# Patient Record
Sex: MALE | Race: WHITE | NOT HISPANIC OR LATINO | Employment: UNEMPLOYED | URBAN - METROPOLITAN AREA
[De-identification: names, ages, dates, MRNs, and addresses within clinical notes are randomized per-mention and may not be internally consistent; named-entity substitution may affect disease eponyms.]

---

## 2019-07-18 ENCOUNTER — LAB VISIT (OUTPATIENT)
Dept: LAB | Facility: HOSPITAL | Age: 44
End: 2019-07-18
Attending: INTERNAL MEDICINE
Payer: MEDICAID

## 2019-07-18 DIAGNOSIS — E53.8 VITAMIN B 12 DEFICIENCY: ICD-10-CM

## 2019-07-18 DIAGNOSIS — B18.2 CHRONIC HEPATITIS C: Primary | ICD-10-CM

## 2019-07-18 DIAGNOSIS — D64.9 ANEMIA: ICD-10-CM

## 2019-07-18 DIAGNOSIS — R73.9 HYPERGLYCEMIA: ICD-10-CM

## 2019-07-18 LAB
ALBUMIN SERPL BCP-MCNC: 4.5 G/DL (ref 3.5–5.2)
ALP SERPL-CCNC: 96 U/L (ref 55–135)
ALT SERPL W/O P-5'-P-CCNC: 171 U/L (ref 10–44)
ANION GAP SERPL CALC-SCNC: 11 MMOL/L (ref 8–16)
AST SERPL-CCNC: 91 U/L (ref 10–40)
BASOPHILS # BLD AUTO: 0.01 K/UL (ref 0–0.2)
BASOPHILS NFR BLD: 0.2 % (ref 0–1.9)
BILIRUB SERPL-MCNC: 0.6 MG/DL (ref 0.1–1)
BUN SERPL-MCNC: 23 MG/DL (ref 6–20)
CALCIUM SERPL-MCNC: 10.2 MG/DL (ref 8.7–10.5)
CHLORIDE SERPL-SCNC: 101 MMOL/L (ref 95–110)
CHOLEST SERPL-MCNC: 269 MG/DL (ref 120–199)
CHOLEST/HDLC SERPL: 4.8 {RATIO} (ref 2–5)
CO2 SERPL-SCNC: 27 MMOL/L (ref 23–29)
CREAT SERPL-MCNC: 1.1 MG/DL (ref 0.5–1.4)
DIFFERENTIAL METHOD: NORMAL
EOSINOPHIL # BLD AUTO: 0.2 K/UL (ref 0–0.5)
EOSINOPHIL NFR BLD: 3.9 % (ref 0–8)
ERYTHROCYTE [DISTWIDTH] IN BLOOD BY AUTOMATED COUNT: 14.4 % (ref 11.5–14.5)
EST. GFR  (AFRICAN AMERICAN): >60 ML/MIN/1.73 M^2
EST. GFR  (NON AFRICAN AMERICAN): >60 ML/MIN/1.73 M^2
ESTIMATED AVG GLUCOSE: 108 MG/DL (ref 68–131)
FERRITIN SERPL-MCNC: 59 NG/ML (ref 20–300)
GLUCOSE SERPL-MCNC: 102 MG/DL (ref 70–110)
HBA1C MFR BLD HPLC: 5.4 % (ref 4–5.6)
HCT VFR BLD AUTO: 46 % (ref 40–54)
HDLC SERPL-MCNC: 56 MG/DL (ref 40–75)
HDLC SERPL: 20.8 % (ref 20–50)
HGB BLD-MCNC: 15.2 G/DL (ref 14–18)
LDLC SERPL CALC-MCNC: 177.8 MG/DL (ref 63–159)
LYMPHOCYTES # BLD AUTO: 2.5 K/UL (ref 1–4.8)
LYMPHOCYTES NFR BLD: 47.9 % (ref 18–48)
MCH RBC QN AUTO: 31 PG (ref 27–31)
MCHC RBC AUTO-ENTMCNC: 33 G/DL (ref 32–36)
MCV RBC AUTO: 94 FL (ref 82–98)
MONOCYTES # BLD AUTO: 0.3 K/UL (ref 0.3–1)
MONOCYTES NFR BLD: 6 % (ref 4–15)
NEUTROPHILS # BLD AUTO: 2.2 K/UL (ref 1.8–7.7)
NEUTROPHILS NFR BLD: 42 % (ref 38–73)
NONHDLC SERPL-MCNC: 213 MG/DL
PLATELET # BLD AUTO: 308 K/UL (ref 150–350)
PMV BLD AUTO: 9.7 FL (ref 9.2–12.9)
POTASSIUM SERPL-SCNC: 4.2 MMOL/L (ref 3.5–5.1)
PROT SERPL-MCNC: 8.1 G/DL (ref 6–8.4)
RBC # BLD AUTO: 4.9 M/UL (ref 4.6–6.2)
SODIUM SERPL-SCNC: 139 MMOL/L (ref 136–145)
TRIGL SERPL-MCNC: 176 MG/DL (ref 30–150)
TSH SERPL DL<=0.005 MIU/L-ACNC: 1.76 UIU/ML (ref 0.4–4)
TSH SERPL DL<=0.005 MIU/L-ACNC: 1.76 UIU/ML (ref 0.4–4)
VIT B12 SERPL-MCNC: 632 PG/ML (ref 210–950)
WBC # BLD AUTO: 5.18 K/UL (ref 3.9–12.7)

## 2019-07-18 PROCEDURE — 82728 ASSAY OF FERRITIN: CPT

## 2019-07-18 PROCEDURE — 80061 LIPID PANEL: CPT

## 2019-07-18 PROCEDURE — 84443 ASSAY THYROID STIM HORMONE: CPT

## 2019-07-18 PROCEDURE — 83036 HEMOGLOBIN GLYCOSYLATED A1C: CPT

## 2019-07-18 PROCEDURE — 85025 COMPLETE CBC W/AUTO DIFF WBC: CPT

## 2019-07-18 PROCEDURE — 82607 VITAMIN B-12: CPT

## 2019-07-18 PROCEDURE — 87522 HEPATITIS C REVRS TRNSCRPJ: CPT

## 2019-07-18 PROCEDURE — 36415 COLL VENOUS BLD VENIPUNCTURE: CPT

## 2019-07-18 PROCEDURE — 80053 COMPREHEN METABOLIC PANEL: CPT

## 2019-07-23 LAB
HCV RNA SERPL NAA+PROBE-LOG IU: 5.48 LOG (10) IU/ML
HCV RNA SERPL QL NAA+PROBE: DETECTED IU/ML
HCV RNA SPEC NAA+PROBE-ACNC: ABNORMAL IU/ML

## 2019-08-02 ENCOUNTER — TELEPHONE (OUTPATIENT)
Dept: GASTROENTEROLOGY | Facility: CLINIC | Age: 44
End: 2019-08-02

## 2019-08-02 NOTE — TELEPHONE ENCOUNTER
Spoke with patient informing him that Dr. Womack has an opening on 8/12/2019 in [place]. Patient stated he can make that appointment. Patient scheduled his office visit with Dr. Womack in [place].

## 2019-08-02 NOTE — TELEPHONE ENCOUNTER
----- Message from Naty Feng sent at 8/2/2019 10:03 AM CDT -----  Contact: [phone]/self  Type:  Sooner Appointment Request     Caller is requesting a sooner appointment.  Caller declined first available appointment listed below.  Caller will not accept being placed on the waitlist and is requesting a message be sent to doctor.  Name of Caller: pt  When is the first available appointment? pt  Symptoms or Reason: rectal bleeding  Would the patient rather a call back or a response via [facility]? Call back  Best Call Back Number: [phone]  Additional Information:

## 2019-08-12 ENCOUNTER — OFFICE VISIT (OUTPATIENT)
Dept: GASTROENTEROLOGY | Facility: CLINIC | Age: 44
End: 2019-08-12
Payer: MEDICAID

## 2019-08-12 VITALS
RESPIRATION RATE: 18 BRPM | HEIGHT: 71 IN | WEIGHT: 237.63 LBS | SYSTOLIC BLOOD PRESSURE: 120 MMHG | DIASTOLIC BLOOD PRESSURE: 90 MMHG | HEART RATE: 80 BPM | BODY MASS INDEX: 33.27 KG/M2

## 2019-08-12 DIAGNOSIS — K52.9 CHRONIC DIARRHEA: Primary | ICD-10-CM

## 2019-08-12 DIAGNOSIS — K62.5 RECTAL BLEEDING: ICD-10-CM

## 2019-08-12 DIAGNOSIS — B18.2 CHRONIC HEPATITIS C WITHOUT HEPATIC COMA: ICD-10-CM

## 2019-08-12 PROCEDURE — 99204 OFFICE O/P NEW MOD 45 MIN: CPT | Mod: S$PBB,,, | Performed by: INTERNAL MEDICINE

## 2019-08-12 PROCEDURE — 99204 PR OFFICE/OUTPT VISIT, NEW, LEVL IV, 45-59 MIN: ICD-10-PCS | Mod: S$PBB,,, | Performed by: INTERNAL MEDICINE

## 2019-08-12 PROCEDURE — 99214 OFFICE O/P EST MOD 30 MIN: CPT | Mod: PBBFAC | Performed by: INTERNAL MEDICINE

## 2019-08-12 PROCEDURE — 99999 PR PBB SHADOW E&M-EST. PATIENT-LVL IV: CPT | Mod: PBBFAC,,, | Performed by: INTERNAL MEDICINE

## 2019-08-12 PROCEDURE — 99999 PR PBB SHADOW E&M-EST. PATIENT-LVL IV: ICD-10-PCS | Mod: PBBFAC,,, | Performed by: INTERNAL MEDICINE

## 2019-08-12 RX ORDER — METOPROLOL SUCCINATE 50 MG/1
50 TABLET, EXTENDED RELEASE ORAL DAILY
Refills: 11 | Status: ON HOLD | COMMUNITY
Start: 2019-08-05 | End: 2020-02-05 | Stop reason: HOSPADM

## 2019-08-12 RX ORDER — POLYETHYLENE GLYCOL 3350, SODIUM SULFATE ANHYDROUS, SODIUM BICARBONATE, SODIUM CHLORIDE, POTASSIUM CHLORIDE 236; 22.74; 6.74; 5.86; 2.97 G/4L; G/4L; G/4L; G/4L; G/4L
POWDER, FOR SOLUTION ORAL
Qty: 1 BOTTLE | Refills: 0 | Status: ON HOLD | OUTPATIENT
Start: 2019-08-12 | End: 2019-08-20 | Stop reason: ALTCHOICE

## 2019-08-12 RX ORDER — ERGOCALCIFEROL 1.25 MG/1
CAPSULE ORAL
Refills: 5 | Status: ON HOLD | COMMUNITY
Start: 2019-05-09 | End: 2020-02-05 | Stop reason: HOSPADM

## 2019-08-12 RX ORDER — OMEPRAZOLE 20 MG/1
20 CAPSULE, DELAYED RELEASE ORAL DAILY
Refills: 11 | Status: ON HOLD | COMMUNITY
Start: 2019-08-05 | End: 2020-02-05 | Stop reason: HOSPADM

## 2019-08-12 RX ORDER — PRAVASTATIN SODIUM 20 MG/1
20 TABLET ORAL
COMMUNITY
End: 2019-10-28

## 2019-08-12 NOTE — PROGRESS NOTES
"Subjective:       Patient ID: You Seals is a 43 y.o. male.    Chief Complaint: Diarrhea and Hepatitis C    42 yo M here for evaluation of chronic diarrhea, rectal bleeding, and Hep C.  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  In 4/2018 he had an URI with "high fever" for which he was given abx, and has had severe watery diarrhea since that time.  He has 6-7 BM per day, all of which are very urgent and explosive.  He has nocturnal symptoms at times, and states that he always has diarrhea upon waking and always after eating.  Intermittently he will see red blood in the toilet and on tissue paper.  He denies weight loss.  He went on a healthy low carb diet and lost 18# which he states has helped with the "pressure" he feels in his belly, but has made no change in the bowel habits.  He denies FH of CRC or IBD.  He saw a GI physician in [place] (he is from [place] but lives in [place] now) and had EGD with him, and was dx with HH.  He has also been dx with ventral hernia.    Review of Systems   Constitutional: Negative for appetite change and unexpected weight change.   Eyes: Negative for photophobia and visual disturbance.   Respiratory: Negative for chest tightness, shortness of breath and wheezing.    Cardiovascular: Negative for chest pain, palpitations and leg swelling.   Genitourinary: Negative for dysuria, flank pain and hematuria.   Musculoskeletal: Negative for joint swelling and myalgias.   Skin: Negative for color change and rash.   Neurological: Negative for dizziness and speech difficulty.   Psychiatric/Behavioral: Negative for confusion and hallucinations.       Objective:       BP (!) 120/90 (BP Location: Right arm, Patient Position: Sitting, BP Method: Medium (Manual))   Pulse 80   Resp 18   Ht 5' 11" (1.803 m)   Wt 107.8 kg (237 lb 10.5 oz)   BMI 33.15 kg/m²     Physical Exam   Constitutional: He is oriented to person, " place, and time. He appears well-developed and well-nourished.   HENT:   Head: Normocephalic and atraumatic.   Eyes: Pupils are equal, round, and reactive to light. EOM are normal.   Neck: Normal range of motion. Neck supple.   Cardiovascular: Normal rate, regular rhythm, normal heart sounds and intact distal pulses.   Pulmonary/Chest: Effort normal and breath sounds normal.   Abdominal: Soft. Bowel sounds are normal. He exhibits no distension and no mass. There is no tenderness. There is no rebound and no guarding.   + diastasis recti, but no herniation noted   Musculoskeletal: He exhibits no edema or deformity.   Neurological: He is alert and oriented to person, place, and time.   Skin: Skin is warm and dry.   Psychiatric: He has a normal mood and affect. His behavior is normal.       Lab Results   Component Value Date    WBC 5.18 07/18/2019    HGB 15.2 07/18/2019    HCT 46.0 07/18/2019    MCV 94 07/18/2019     07/18/2019     CMP  Sodium   Date Value Ref Range Status   07/18/2019 139 136 - 145 mmol/L Final     Potassium   Date Value Ref Range Status   07/18/2019 4.2 3.5 - 5.1 mmol/L Final     Chloride   Date Value Ref Range Status   07/18/2019 101 95 - 110 mmol/L Final     CO2   Date Value Ref Range Status   07/18/2019 27 23 - 29 mmol/L Final     Glucose   Date Value Ref Range Status   07/18/2019 102 70 - 110 mg/dL Final     BUN, Bld   Date Value Ref Range Status   07/18/2019 23 (H) 6 - 20 mg/dL Final     Creatinine   Date Value Ref Range Status   07/18/2019 1.1 0.5 - 1.4 mg/dL Final     Calcium   Date Value Ref Range Status   07/18/2019 10.2 8.7 - 10.5 mg/dL Final     Total Protein   Date Value Ref Range Status   07/18/2019 8.1 6.0 - 8.4 g/dL Final     Albumin   Date Value Ref Range Status   07/18/2019 4.5 3.5 - 5.2 g/dL Final     Total Bilirubin   Date Value Ref Range Status   07/18/2019 0.6 0.1 - 1.0 mg/dL Final     Comment:     For infants and newborns, interpretation of results should be based  on  "gestational age, weight and in agreement with clinical  observations.  Premature Infant recommended reference ranges:  Up to 24 hours.............<8.0 mg/dL  Up to 48 hours............<12.0 mg/dL  3-5 days..................<15.0 mg/dL  6-29 days.................<15.0 mg/dL       Alkaline Phosphatase   Date Value Ref Range Status   07/18/2019 96 55 - 135 U/L Final     AST   Date Value Ref Range Status   07/18/2019 91 (H) 10 - 40 U/L Final     ALT   Date Value Ref Range Status   07/18/2019 171 (H) 10 - 44 U/L Final     Anion Gap   Date Value Ref Range Status   07/18/2019 11 8 - 16 mmol/L Final     eGFR if    Date Value Ref Range Status   07/18/2019 >60 >60 mL/min/1.73 m^2 Final     eGFR if non    Date Value Ref Range Status   07/18/2019 >60 >60 mL/min/1.73 m^2 Final     Comment:     Calculation used to obtain the estimated glomerular filtration  rate (eGFR) is the CKD-EPI equation.        Hep C RNA ~ 300K, no genotype done    Assessment:       1. Chronic diarrhea    2. Rectal bleeding    3. Chronic hepatitis C without hepatic coma        Plan:       Chronic diarrhea; Rectal bleeding  -     Clostridium difficile EIA; Future; Expected date: 08/12/2019  -     Giardia / Cryptosporidum, EIA; Future; Expected date: 08/12/2019  -     WBC, Stool; Future; Expected date: 08/12/2019  -     Pancreatic elastase, fecal; Future; Expected date: 08/12/2019  -     Stool culture; Future; Expected date: 08/12/2019  -     Case request GI: COLONOSCOPY  -     polyethylene glycol (GOLYTELY,NULYTELY) 236-22.74-6.74 -5.86 gram suspension; Use as directed  Dispense: 1 Bottle; Refill: 0  &&&&&&  IF C. DIFF IS POSITIVE, THE COLONOSCOPY WILL BE DELAYED UNTIL TREATMENT COMPLETE.    Chronic hepatitis C without hepatic coma  -     Ambulatory Referral to Hepatitis C  -     I will draw "hep c labs" at the colonoscopy appt so that the things needed will be complete for that appt.  I will defer u/s as they may want " Fibroscan instead.

## 2019-08-12 NOTE — PATIENT INSTRUCTIONS
GOLYTELY/ COLYTE/ NULYTELY Instructions    You are scheduled for a colonoscopy with Dr. Womack on Thursday, August 22 at [facility] located at [address] [place] [address] in [place].  Enter through the [facility] Entrance.  Check-in at Same Day Surgery.      You will receive a call 1-2 days before your colonoscopy to tell you the time to arrive.  If you have not received a call by the day before your procedure, call the Endoscopy Lab at [phone].    To ensure that your test is accurate and complete, you MUST follow these instructions listed below.  If you have any questions, please call our office at [phone].  Plan on being at the hospital for your procedure for 3-4 hours.    1.  Follow a CLEAR LIQUID DIET for the entire day before your scheduled colonoscopy.  This means no solid food the entire day starting when you wake.  You may have as much of the clear liquids as you want throughout the day.   CLEAR LIQUID DIET:   - Avoid Red, Orange, Purple, and/or Blue food coloring   - NO DAIRY   - You can have:  Coffee with sugar (no creamer), tea, water, soda, apple or white grape juice, chicken or beef broth/bouillon (no meat, noodles, or veggies), green/yellow popsicles, green/yellow Jell-O, lemonade.    2.  MIX GOLYTELY/COLYTE/NULYTELY (all names for same product) WITH ONE (1) GALLON OF WATER.  YOU MAY ADD A FLAVOR PACKET OR YELLOW/GREEN POWDER DRINK MIX TO THIS.  PUT IN REFRIGERATOR.  This is easier to drink if this solution is cold, so you can mix the solution one day ahead of time and place in the refrigerator prior to drinking.  You have to drink the solution within 24-36 hours of mixing it.  Do NOT put this solution over ice.  It IS ok to drink with a straw.    3. AT 5 PM THE DAY BEFORE YOUR COLONOSCOPY, DRINK ONE (1) 8 OUNCE GLASS OF MIXTURE EVERY 10 MINUTES UNTIL HALF OF THE GALLON IS CONSUMED.  Keep this mixture cold and in refrigerator as much as you can while drinking it.  Place the remaining  half of mixture in the refrigerator when you finish the first half.    4.  The endoscopy department will call you 2 days before your colonoscopy to tell you the exact time to arrive, AND to tell you the exact time to drink the 2nd portion of your prep (which will be FIVE HOURS BEFORE YOUR ARRIVAL TIME).  At this time given to you, DRINK ONE (1) 8 OUNCE GLASS OF MIXTURE EVERY 10 MINUTES UNTIL THE OTHER HALF IS CONSUMED. Keep the mixture cold while you are drinking it. Once this is complete, you may not have ANYTHING else by mouth!      5.  You must have someone with you to DRIVE YOU HOME since you will be receiving IV sedation for the colonoscopy.    6.  It is ok to take your heart, blood pressure, and seizure medications in the morning of your test with a SIP of water.  Hold other medications until after your procedure.  Do NOT have anything else to eat or drink the morning of your colonoscopy.  It is ok to brush your teeth.    7.  If you are on blood thinners THAT YOU HAVE BEEN INSTRUCTED TO HOLD BY YOUR DOCTOR FOR THIS PROCEDURE, then do NOT take this the morning of your colonoscopy.  Do NOT stop these medications on your own, they must be approved to be held by your doctor.  Your colonoscopy can NOT be done if you are on these medications.  Examples of blood thinners include: Coumadin, Aggrenox, Plavix, Pradaxa, Reapro, Pletal, Xarelto, Ticagrelor, Brilinta, Eliquis, and high dose aspirin (325 mg).  You do not have to stop baby aspirin 81 mg.    8.  IF YOU ARE DIABETIC:  NO INSULIN OR ORAL MEDICATIONS THE MORNING OF THE COLONOSCOPY.  TAKE ONLY HALF THE DOSE OF YOUR INSULIN THE DAY BEFORE THE COLONOSCOPY.  DO NOT TAKE ANY ORAL DIABETIC MEDICATIONS THE DAY BEFORE THE COLONOSCOPY.  IF YOU ARE AN INSULIN DEPENDENT DIABETIC WITH UNSTABLE BLOOD SUGARS, NOTIFY YOUR PRIMARY CARE PHYSICIAN FOR INSTRUCTIONS.

## 2019-08-14 ENCOUNTER — LAB VISIT (OUTPATIENT)
Dept: LAB | Facility: HOSPITAL | Age: 44
End: 2019-08-14
Attending: INTERNAL MEDICINE
Payer: MEDICAID

## 2019-08-14 ENCOUNTER — TELEPHONE (OUTPATIENT)
Dept: GASTROENTEROLOGY | Facility: CLINIC | Age: 44
End: 2019-08-14

## 2019-08-14 ENCOUNTER — TELEPHONE (OUTPATIENT)
Dept: HEPATOLOGY | Facility: CLINIC | Age: 44
End: 2019-08-14

## 2019-08-14 DIAGNOSIS — K52.9 CHRONIC DIARRHEA: ICD-10-CM

## 2019-08-14 LAB
C DIFF GDH STL QL: NEGATIVE
C DIFF TOX A+B STL QL IA: NEGATIVE
WBC #/AREA STL HPF: NORMAL /[HPF]

## 2019-08-14 PROCEDURE — 87046 STOOL CULTR AEROBIC BACT EA: CPT | Mod: 59

## 2019-08-14 PROCEDURE — 89055 LEUKOCYTE ASSESSMENT FECAL: CPT

## 2019-08-14 PROCEDURE — 87329 GIARDIA AG IA: CPT

## 2019-08-14 PROCEDURE — 87449 NOS EACH ORGANISM AG IA: CPT

## 2019-08-14 PROCEDURE — 87427 SHIGA-LIKE TOXIN AG IA: CPT | Mod: 59

## 2019-08-14 PROCEDURE — 87045 FECES CULTURE AEROBIC BACT: CPT

## 2019-08-14 PROCEDURE — 82656 EL-1 FECAL QUAL/SEMIQ: CPT

## 2019-08-14 NOTE — TELEPHONE ENCOUNTER
----- Message from Annemarie Knox sent at 8/12/2019  2:18 PM CDT -----  Contact: Rajwinder hadley/ [facility] location       ----- Message -----  From: Rosalino Lua  Sent: 8/12/2019   2:08 PM  To: Hepatology Scheduling    Needs Advice    Reason for call: Pt need to be scheduled         Communication Preference: [phone]     Additional Information: N/A

## 2019-08-14 NOTE — TELEPHONE ENCOUNTER
----- Message from Mariangel Woods sent at 2019  9:23 AM CDT -----  Contact: Girlfriend- Naomi Seals  MRN: [id number]  : 1975  PCP: Marisa French  Home Phone      [phone]  Work Phone      Not on file.  Mobile          [phone]      MESSAGE: Naomi states they cannot make it to appt on 2019 for colonoscopy and would like to RS this procedure. Please advise  Phone:  [phone]

## 2019-08-15 LAB
CRYPTOSP AG STL QL IA: NEGATIVE
E COLI SXT1 STL QL IA: NEGATIVE
E COLI SXT2 STL QL IA: NEGATIVE
G LAMBLIA AG STL QL IA: NEGATIVE

## 2019-08-17 LAB — BACTERIA STL CULT: NORMAL

## 2019-08-20 ENCOUNTER — APPOINTMENT (OUTPATIENT)
Dept: LAB | Facility: HOSPITAL | Age: 44
End: 2019-08-20
Attending: INTERNAL MEDICINE
Payer: MEDICAID

## 2019-08-21 LAB — ELASTASE 1, FECAL: 219 MCG/G

## 2019-09-03 ENCOUNTER — TELEPHONE (OUTPATIENT)
Dept: GASTROENTEROLOGY | Facility: CLINIC | Age: 44
End: 2019-09-03

## 2019-09-03 NOTE — TELEPHONE ENCOUNTER
----- Message from Marce Womack MD sent at 8/27/2019  2:30 PM CDT -----  Biopsies are normal.  Repeat cscope in 10 years.  RTC as needed.

## 2019-09-27 ENCOUNTER — INITIAL CONSULT (OUTPATIENT)
Dept: HEPATOLOGY | Facility: CLINIC | Age: 44
End: 2019-09-27
Payer: MEDICAID

## 2019-09-27 ENCOUNTER — LAB VISIT (OUTPATIENT)
Dept: LAB | Facility: HOSPITAL | Age: 44
End: 2019-09-27
Payer: MEDICAID

## 2019-09-27 ENCOUNTER — PATIENT MESSAGE (OUTPATIENT)
Dept: GASTROENTEROLOGY | Facility: CLINIC | Age: 44
End: 2019-09-27

## 2019-09-27 VITALS
HEIGHT: 71 IN | TEMPERATURE: 97 F | OXYGEN SATURATION: 96 % | HEART RATE: 67 BPM | DIASTOLIC BLOOD PRESSURE: 77 MMHG | SYSTOLIC BLOOD PRESSURE: 123 MMHG | RESPIRATION RATE: 18 BRPM | BODY MASS INDEX: 33.3 KG/M2 | WEIGHT: 237.88 LBS

## 2019-09-27 DIAGNOSIS — R74.8 ABNORMAL TRANSAMINASES: ICD-10-CM

## 2019-09-27 DIAGNOSIS — B18.2 CHRONIC HEPATITIS C WITHOUT HEPATIC COMA: ICD-10-CM

## 2019-09-27 DIAGNOSIS — B18.2 CHRONIC HEPATITIS C WITHOUT HEPATIC COMA: Primary | ICD-10-CM

## 2019-09-27 LAB
HBV CORE AB SERPL QL IA: NEGATIVE
HBV SURFACE AB SER-ACNC: NEGATIVE M[IU]/ML
HBV SURFACE AG SERPL QL IA: NEGATIVE
HEPATITIS A ANTIBODY, IGG: NEGATIVE

## 2019-09-27 PROCEDURE — 99999 PR PBB SHADOW E&M-EST. PATIENT-LVL IV: ICD-10-PCS | Mod: PBBFAC,,, | Performed by: PHYSICIAN ASSISTANT

## 2019-09-27 PROCEDURE — 86790 VIRUS ANTIBODY NOS: CPT

## 2019-09-27 PROCEDURE — 99214 OFFICE O/P EST MOD 30 MIN: CPT | Mod: PBBFAC | Performed by: PHYSICIAN ASSISTANT

## 2019-09-27 PROCEDURE — 87902 NFCT AGT GNTYP ALYS HEP C: CPT

## 2019-09-27 PROCEDURE — 99999 PR PBB SHADOW E&M-EST. PATIENT-LVL IV: CPT | Mod: PBBFAC,,, | Performed by: PHYSICIAN ASSISTANT

## 2019-09-27 PROCEDURE — 87340 HEPATITIS B SURFACE AG IA: CPT

## 2019-09-27 PROCEDURE — 36415 COLL VENOUS BLD VENIPUNCTURE: CPT

## 2019-09-27 PROCEDURE — 99214 OFFICE O/P EST MOD 30 MIN: CPT | Mod: S$PBB,,, | Performed by: PHYSICIAN ASSISTANT

## 2019-09-27 PROCEDURE — 86706 HEP B SURFACE ANTIBODY: CPT

## 2019-09-27 PROCEDURE — 86704 HEP B CORE ANTIBODY TOTAL: CPT

## 2019-09-27 PROCEDURE — 99214 PR OFFICE/OUTPT VISIT, EST, LEVL IV, 30-39 MIN: ICD-10-PCS | Mod: S$PBB,,, | Performed by: PHYSICIAN ASSISTANT

## 2019-09-27 NOTE — PROGRESS NOTES
HEPATOLOGY CLINIC VISIT NOTE - HCV clinic    REFERRING PROVIDER: Marce Womack MD     CHIEF COMPLAINT: Hepatitis C   (here w/ significant other)    HISTORY: This is a 43 y.o. White male with chronic hepatitis C, here for further eval / mngmt.     Originally diagnosed about a year ago. Seen by a specialist at that time. Recalls having a fibroscan and was prescribed 8 weeks of mavyret. Did not complete therapy b/c he moved - unclear how much he took but sounds as if less than 4 weeks. Ultimately current HCV RNA 7/2019 is > 200,000. Denies new risk factor since mavyret use so I assume this is relapse and not new infection.    Risks for HCV:  Numerous tattoos: first one age 19    IVDA: first use 2005, 4 yrs clean now    Intranasal drug use: first use 2005, 4 yrs clean now    No occupational exposure    HCV history:  - Genotype 1a (8/2019)  - NS5A resistance not known  - HCV ,000 IU/mL (7/2019)  - Prior HCV treatment: partial treatment w/ Mavyret (several weeks) - presumed relapse since still viremic    Liver staging:  FibroScan - outside provider ~ 1 year ago. Results not avail to me  HCV FibroSURE A0, F0 - 8/2019    CT 12/2018 - unremarkable liver / spleen  Labs and imaging reveal well preserved liver function w/o concern for advanced fibrosis:  Labs 7/2019  FIB-4 -- 0.95; Cirrhosis less likely  APRI -- 0.74; Cirrhosis less likely      Feels well  Denies jaundice, dark urine, abdominal distention, hematemesis, melena, slowed mentation.   No abnormal skin rashes. No generalized joint pain.                     Past Medical History:   Diagnosis Date    Abdominal hernia     Hepatitis C 2018    Hiatal hernia 2018    High cholesterol 2018    Hypertension 2018     Past Surgical History:   Procedure Laterality Date    COLONOSCOPY N/A 8/20/2019    Procedure: COLONOSCOPY;  Surgeon: Marce Womack MD;  Location: [facility];  Service: Endoscopy;  Laterality: N/A;  Can not arrive before 8 am     EXTRACORPOREAL SHOCK WAVE LITHOTRIPSY      GASTROSCOPY      Inginal Hernia  1978       FAMILY HISTORY: Negative for liver disease    SOCIAL HISTORY:   Works as . Previously at [employer] but now on his own.  Social History     Tobacco Use   Smoking Status Former Smoker   Smokeless Tobacco Never Used   Tobacco Comment    quit 2018     Alcohol - no heavy. none  Drugs - prior IV and intranasal drug use. First use 2005. Clean since 2015      ROS:   No fever, chills, weight loss, fatigue  No chest pain, palpitations, dyspnea, cough  No abdominal pain, nausea, vomiting, GERD  No dysuria, hematuria   No skin rashes   No headaches  No lower extremity edema  No depression or anxiety      PHYSICAL EXAM:  Friendly White male, in no acute distress; alert and oriented to person, place and time  VITALS: reviewed  HEENT: Sclerae anicteric.   NECK: Supple  CVS: Regular rate and rhythm. No murmurs  LUNGS: Normal respiratory effort. Clear bilaterally  ABDOMEN: Flat, soft, nontender. No organomegaly or masses. No ascites or hernias. Good bowel sounds.    SKIN: Warm and dry. No jaundice, No obvious rashes. Numerous tattoos  EXTREMITIES: No lower extremity edema  NEURO/PSYCH: Normal gate. Memory intact. Thought and speech pattern appropriate. Behavior normal. No depression or anxiety noted.    RECENT LABS:  Lab Results   Component Value Date    WBC 5.18 07/18/2019    HGB 15.2 07/18/2019     07/18/2019     Lab Results   Component Value Date    INR 1.0 08/20/2019     Lab Results   Component Value Date    AST 91 (H) 07/18/2019     (H) 07/18/2019    BILITOT 0.6 07/18/2019    ALBUMIN 4.5 07/18/2019    ALKPHOS 96 07/18/2019    CREATININE 1.1 07/18/2019    BUN 23 (H) 07/18/2019     07/18/2019    K 4.2 07/18/2019    AFP 2.5 08/20/2019         RECENT IMAGING:  CT abdomen w/ IV contrast 12/2018:      ASSESSMENT  43 y.o. White male with:  1. CHRONIC HEPATITIS C, GENOTYPE 1a   -- Prior HCV treatment - partial treatment  w/ mavyret, still viremic  -- Elevated transaminases  -- Unknown Immunity to HAV & HBV    2. PRIOR DRUG USE  -- clean x 4 yrs      EDUCATION:  The natural history of Hepatitis C, including potential progression to cirrhosis was reviewed. We discussed the increased progression of liver disease secondary to alcohol use; patient was advised to avoid alcohol completely.     Transmission of Hepatitis C was reviewed, including possible sexual transmission. Sexual contacts should be screened. Patient should avoid sharing personal products such as razors, toothbrushes, etc.     Recommend avoiding raw seafood.  Limit acetaminophen to 2000mg daily.          PLAN:  1. Labs today  Orders Placed This Encounter   Procedures    Hepatitis B surface antigen    Hepatitis B surface antibody    Hepatitis B core antibody, total    Hepatitis A antibody, IgG    HCV RNA NS5A RESISTANCE,JORGE. 1     2. Obtain outside fibroscan report  3. F/u visit 1 month to discuss HCV retreatment

## 2019-09-27 NOTE — PATIENT INSTRUCTIONS
HCV COUNSELING   AVOID ALL alcohol (includes beer, wine and liquor)   Tylenol/acetaminophen is OKAY for pain, no more than 2000 mg per day   NO RAW SEAFOOD (sushi, raw oysters) due to the risk of infection    Do not share things that could cut you, such as a razor or toothbrush.  Sexual partners should be screened for HCV.

## 2019-09-27 NOTE — LETTER
September 27, 2019      Marce Womack MD  [address]  [address]   [facility] [address]           [facility] - Hepatitis C  [address]  [facility] [address]  Phone: [phone]  Fax: [phone]          Patient: You Seals   MR Number: [id number]   YOB: 1975   Date of Visit: 9/27/2019       Dear Dr. Marce Womack:    Thank you for referring You Seals to me for evaluation. Attached you will find relevant portions of my assessment and plan of care.    If you have questions, please do not hesitate to call me. I look forward to following You Seals along with you.    Sincerely,    Jennifer B. Scheuermann, PA    Enclosure  CC:  No Recipients    If you would like to receive this communication electronically, please contact [email or web address] or [phone] to request more information on [employer] access.    For providers and/or their staff who would like to refer a patient to [facility], please contact us through our one-stop-shop provider referral line, [facility], at [phone].    If you feel you have received this communication in error or would no longer like to receive these types of communications, please e-mail [email or web address]

## 2019-10-01 LAB — MAYO MISCELLANEOUS RESULT (REF): NORMAL

## 2019-10-03 ENCOUNTER — OFFICE VISIT (OUTPATIENT)
Dept: SURGERY | Facility: CLINIC | Age: 44
End: 2019-10-03
Payer: MEDICAID

## 2019-10-03 VITALS
SYSTOLIC BLOOD PRESSURE: 139 MMHG | WEIGHT: 238 LBS | HEART RATE: 89 BPM | HEIGHT: 71 IN | DIASTOLIC BLOOD PRESSURE: 87 MMHG | BODY MASS INDEX: 33.32 KG/M2

## 2019-10-03 DIAGNOSIS — K52.9 CHRONIC DIARRHEA: ICD-10-CM

## 2019-10-03 DIAGNOSIS — K44.9 HIATAL HERNIA WITH GERD: ICD-10-CM

## 2019-10-03 DIAGNOSIS — B18.2 CHRONIC HEPATITIS C WITHOUT HEPATIC COMA: ICD-10-CM

## 2019-10-03 DIAGNOSIS — K57.92 DIVERTICULITIS: ICD-10-CM

## 2019-10-03 DIAGNOSIS — K43.9 VENTRAL HERNIA WITHOUT OBSTRUCTION OR GANGRENE: Primary | ICD-10-CM

## 2019-10-03 DIAGNOSIS — K21.9 HIATAL HERNIA WITH GERD: ICD-10-CM

## 2019-10-03 PROCEDURE — 99205 PR OFFICE/OUTPT VISIT, NEW, LEVL V, 60-74 MIN: ICD-10-PCS | Mod: S$PBB,,, | Performed by: SURGERY

## 2019-10-03 PROCEDURE — 99999 PR PBB SHADOW E&M-EST. PATIENT-LVL III: CPT | Mod: PBBFAC,,, | Performed by: SURGERY

## 2019-10-03 PROCEDURE — 99999 PR PBB SHADOW E&M-EST. PATIENT-LVL III: ICD-10-PCS | Mod: PBBFAC,,, | Performed by: SURGERY

## 2019-10-03 PROCEDURE — 99213 OFFICE O/P EST LOW 20 MIN: CPT | Mod: PBBFAC | Performed by: SURGERY

## 2019-10-03 PROCEDURE — 99205 OFFICE O/P NEW HI 60 MIN: CPT | Mod: S$PBB,,, | Performed by: SURGERY

## 2019-10-03 NOTE — PROGRESS NOTES
History & Physical  General Surgery Clinic    SUBJECTIVE:     Chief complaint: evaluation of hiatal and 'epigastric' hernia    History of Present Illness:  Patient is a 43 y.o. male w/ a PMHx of Hep C and diverticulosis who presents to the clinic for evaluation of an abdominal bulge and a hiatal hernia.   Pt is relatively symptom free currently and only endorses intermittent heartburn but claims that in the past year he has had to reduce his portion size due to regurgitation of solid food.   He denies dysphagia, globus. He had some form of imaging a year ago in [place], [place] that showed that his stomach was in his thorax.     His second issue is a chronic epigastric bulge that he has had since he was a child.   He complains of constant dull, aching pain that prevents him from getting a restful night's sleep.   He denies N/V. He is chronically diarrheal and is seen by a GI physician for diverticulosis.          Review of Systems   Constitutional: Negative for diaphoresis, fever and weight loss.   HENT: Negative for congestion and hearing loss.    Eyes: Negative for photophobia.   Respiratory: Negative for cough and shortness of breath.    Cardiovascular: Negative for chest pain, claudication and leg swelling.   Gastrointestinal: Positive for abdominal pain, diarrhea, heartburn and nausea. Negative for vomiting.   Genitourinary: Negative for dysuria and frequency.   Musculoskeletal: Negative for joint pain and myalgias.   Skin: Negative for rash.   Neurological: Negative for dizziness and headaches.   Endo/Heme/Allergies: Does not bruise/bleed easily.   Psychiatric/Behavioral: Negative for depression.        Past Medical History:  Past Medical History:   Diagnosis Date    Abdominal hernia     Hepatitis C 2018    Hiatal hernia 2018    High cholesterol 2018    History of kidney stones     Hypertension 2018       Past Surgical History:  Past Surgical History:   Procedure Laterality Date    COLONOSCOPY N/A  8/20/2019    Procedure: COLONOSCOPY;  Surgeon: Marce Womack MD;  Location: [facility];  Service: Endoscopy;  Laterality: N/A;  Can not arrive before 8 am    EXTRACORPOREAL SHOCK WAVE LITHOTRIPSY      GASTROSCOPY      Inginal Hernia  1978    possible appendix removal at this time??       Family History:  Family History   Problem Relation Age of Onset    Fibromyalgia Mother     Heart failure Mother     Hypertension Mother     Diabetes Mother     Heart failure Maternal Grandmother     Stroke Paternal Grandmother     Stroke Paternal Grandfather        Social History:  Social History     Socioeconomic History    Marital status: Single     Spouse name: Not on file    Number of children: Not on file    Years of education: Not on file    Highest education level: Not on file   Occupational History    Not on file   Social Needs    Financial resource strain: Not on file    Food insecurity:     Worry: Not on file     Inability: Not on file    Transportation needs:     Medical: Not on file     Non-medical: Not on file   Tobacco Use    Smoking status: Former Smoker    Smokeless tobacco: Never Used    Tobacco comment: quit 2018   Substance and Sexual Activity    Alcohol use: Not Currently    Drug use: Yes     Types: Marijuana     Comment: last smoked 1 month ago    Sexual activity: Yes   Lifestyle    Physical activity:     Days per week: Not on file     Minutes per session: Not on file    Stress: Not on file   Relationships    Social connections:     Talks on phone: Not on file     Gets together: Not on file     Attends Hinduism service: Not on file     Active member of club or organization: Not on file     Attends meetings of clubs or organizations: Not on file     Relationship status: Not on file   Other Topics Concern    Not on file   Social History Narrative    Not on file       Medications:  Current Outpatient Medications   Medication Sig Dispense Refill    dicyclomine (BENTYL) 20 mg  "tablet Take 1 tablet (20 mg total) by mouth 3 (three) times daily as needed. 60 tablet 2    ergocalciferol (ERGOCALCIFEROL) 50,000 unit Cap TAKE ONE CAPSULE EVERY WEEK FOR LOW VITAMIN D  5    metoprolol succinate (TOPROL-XL) 50 MG 24 hr tablet Take 50 mg by mouth once daily.  11    omeprazole (PRILOSEC) 20 MG capsule Take 20 mg by mouth once daily.  11    pravastatin (PRAVACHOL) 20 MG tablet Take 20 mg by mouth.       No current facility-administered medications for this visit.        Allergies:  Review of patient's allergies indicates:   Allergen Reactions    Sulfa (sulfonamide antibiotics) Hives       OBJECTIVE:     /87   Pulse 89   Ht 5' 11" (1.803 m)   Wt 108 kg (238 lb)   BMI 33.19 kg/m²     Physical Exam   Constitutional: He is oriented to person, place, and time and well-developed, well-nourished, and in no distress.   HENT:   Head: Normocephalic and atraumatic.   Eyes: EOM are normal.   Neck: Neck supple.   Cardiovascular: Normal rate.   Pulmonary/Chest: Effort normal. No respiratory distress.   Abdominal: Soft. He exhibits no distension.   Positive supraumbilical bulge that is exacerbated by valsalva and coughing.   On palpation, no fascial dehiscence is noted except a small dubious location where patient is particularly tender.     Musculoskeletal: Normal range of motion. He exhibits no edema.   Neurological: He is alert and oriented to person, place, and time.   Skin: Skin is warm and dry.         ASSESSMENT/PLAN:     43 y.o. male with Hep C and diverticulosis who presents with a work up for epigastric hernia and hiatal hernia.    - Hiatal Hernia:   - UGI study and manometry to evaluate for function and anatomy   - Pt educated to present with disc with imaging from OSH on next f/u  -Epigastric bulge:   - PE consistent with diastasis recti vs hernia   - CT scan to evaluate     - RTC after work up complete  "

## 2019-10-05 ENCOUNTER — TELEPHONE (OUTPATIENT)
Dept: SURGERY | Facility: HOSPITAL | Age: 44
End: 2019-10-05

## 2019-10-05 ENCOUNTER — HOSPITAL ENCOUNTER (OUTPATIENT)
Dept: RADIOLOGY | Facility: HOSPITAL | Age: 44
Discharge: HOME | End: 2019-10-05
Attending: SURGERY
Payer: MEDICAID

## 2019-10-05 DIAGNOSIS — K43.9 VENTRAL HERNIA WITHOUT OBSTRUCTION OR GANGRENE: ICD-10-CM

## 2019-10-05 LAB
CREAT SERPL-MCNC: 1.1 MG/DL (ref 0.5–1.4)
SAMPLE: NORMAL

## 2019-10-05 PROCEDURE — 74177 CT ABDOMEN PELVIS WITH CONTRAST: ICD-10-PCS | Mod: 26,,, | Performed by: RADIOLOGY

## 2019-10-05 PROCEDURE — 25500020 PHARM REV CODE 255: Performed by: SURGERY

## 2019-10-05 PROCEDURE — 74177 CT ABD & PELVIS W/CONTRAST: CPT | Mod: 26,,, | Performed by: RADIOLOGY

## 2019-10-05 PROCEDURE — 74177 CT ABD & PELVIS W/CONTRAST: CPT | Mod: TC

## 2019-10-05 RX ADMIN — IOHEXOL 15 ML: 350 INJECTION, SOLUTION INTRAVENOUS at 01:10

## 2019-10-05 RX ADMIN — IOHEXOL 15 ML: 350 INJECTION, SOLUTION INTRAVENOUS at 12:10

## 2019-10-05 RX ADMIN — IOHEXOL 100 ML: 350 INJECTION, SOLUTION INTRAVENOUS at 01:10

## 2019-10-07 ENCOUNTER — OFFICE VISIT (OUTPATIENT)
Dept: GASTROENTEROLOGY | Facility: CLINIC | Age: 44
End: 2019-10-07
Payer: MEDICAID

## 2019-10-07 VITALS
SYSTOLIC BLOOD PRESSURE: 110 MMHG | WEIGHT: 244.94 LBS | HEIGHT: 71 IN | HEART RATE: 90 BPM | BODY MASS INDEX: 34.29 KG/M2 | RESPIRATION RATE: 10 BRPM | DIASTOLIC BLOOD PRESSURE: 80 MMHG

## 2019-10-07 DIAGNOSIS — K52.9 CHRONIC DIARRHEA: ICD-10-CM

## 2019-10-07 DIAGNOSIS — K57.92 ACUTE DIVERTICULITIS: Primary | ICD-10-CM

## 2019-10-07 PROCEDURE — 99214 OFFICE O/P EST MOD 30 MIN: CPT | Mod: PBBFAC | Performed by: INTERNAL MEDICINE

## 2019-10-07 PROCEDURE — 99214 OFFICE O/P EST MOD 30 MIN: CPT | Mod: S$PBB,,, | Performed by: INTERNAL MEDICINE

## 2019-10-07 PROCEDURE — 99999 PR PBB SHADOW E&M-EST. PATIENT-LVL IV: CPT | Mod: PBBFAC,,, | Performed by: INTERNAL MEDICINE

## 2019-10-07 PROCEDURE — 99999 PR PBB SHADOW E&M-EST. PATIENT-LVL IV: ICD-10-PCS | Mod: PBBFAC,,, | Performed by: INTERNAL MEDICINE

## 2019-10-07 PROCEDURE — 99214 PR OFFICE/OUTPT VISIT, EST, LEVL IV, 30-39 MIN: ICD-10-PCS | Mod: S$PBB,,, | Performed by: INTERNAL MEDICINE

## 2019-10-07 RX ORDER — AMOXICILLIN AND CLAVULANATE POTASSIUM 875; 125 MG/1; MG/1
1 TABLET, FILM COATED ORAL EVERY 12 HOURS
Qty: 28 TABLET | Refills: 0 | Status: SHIPPED | OUTPATIENT
Start: 2019-10-07 | End: 2019-10-21

## 2019-10-07 NOTE — PATIENT INSTRUCTIONS
1.  Augmentin twice per day for 14 days  2.  Probiotic twice per day for one month  3.  Talk to Dr. Somers (the colorectal surgeon) about the diastasis (abdominal separation)  4.  Once the UGI study (10/9/19) is complete, ask your hiatal hernia surgeon whether you need another EGD?  I can do that if needed.

## 2019-10-07 NOTE — PROGRESS NOTES
"Subjective:       Patient ID: You Seals is a 43 y.o. male.    Chief Complaint: Follow-up and Nausea    44 yo M here for f/u.  He was seen previously for chronic diarrhea.  He states he had been doing ok, but then the diarrhea recurred.  He saw bariatric surgery last week to discuss HH repair, and they ordered CT scan which was resulted 2 days ago.  The on-call surgery resident called to inform him that the CT showed acute diverticulitis, but no abx were sent.  He just so happened to have an appt for f/u with me today.  He states that he has had 3-4 episodes of documented acute diverticulitis (with CT scan) and abx, with the last episode being one year ago.  He also states that he has gotten "flares" in between but has not sought medical attention.  It is difficult to determine whether the diarrhea coincides with these episodes or not.    He is seeking surgical intervention on the HH due to severe regurgitation.  He also complains of a ventral defect which he discussed with the surgeon.    Review of Systems   Constitutional: Negative for chills and fever.   Respiratory: Negative for chest tightness, shortness of breath and wheezing.    Cardiovascular: Negative for chest pain, palpitations and leg swelling.       Objective:       /80   Pulse 90   Resp 10   Ht 5' 11" (1.803 m)   Wt 111.1 kg (244 lb 14.9 oz)   BMI 34.16 kg/m²     Physical Exam   Constitutional: He is oriented to person, place, and time. He appears well-developed and well-nourished.   Abdominal: Soft. Bowel sounds are normal. There is tenderness.   Neurological: He is alert and oriented to person, place, and time.   Psychiatric: He has a normal mood and affect. His behavior is normal.       CT was independently visualized and reviewed by me and showed inflammatory changes in sigmoid colon, no perforation or abscess noted..    Assessment:       1. Acute diverticulitis    2. Chronic diarrhea        Plan:       Acute diverticulitis  -     " Ambulatory consult to Colorectal Surgery, Dr. Somers.  This pt is young and has had recurrent diverticulitis.  Studies show it will likely occur again in young men with recurrence therefore will get surgical opinion.  -     amoxicillin-clavulanate 875-125mg (AUGMENTIN) 875-125 mg per tablet; Take 1 tablet by mouth every 12 (twelve) hours. for 14 days  Dispense: 28 tablet; Refill: 0    Chronic diarrhea        -     It is difficult to determine whether this is IBS-D or whether he has subclinical inflammation from diverticulitis.

## 2019-10-08 ENCOUNTER — TELEPHONE (OUTPATIENT)
Dept: RADIOLOGY | Facility: HOSPITAL | Age: 44
End: 2019-10-08

## 2019-10-09 ENCOUNTER — HOSPITAL ENCOUNTER (OUTPATIENT)
Dept: RADIOLOGY | Facility: HOSPITAL | Age: 44
Discharge: HOME | End: 2019-10-09
Attending: SURGERY
Payer: MEDICAID

## 2019-10-09 DIAGNOSIS — K43.9 VENTRAL HERNIA WITHOUT OBSTRUCTION OR GANGRENE: ICD-10-CM

## 2019-10-09 PROCEDURE — 74241 FL UPPER GI W KUB: CPT | Mod: 26,,, | Performed by: RADIOLOGY

## 2019-10-09 PROCEDURE — 74241 FL UPPER GI W KUB: CPT | Mod: TC,FY

## 2019-10-09 PROCEDURE — 74241 FL UPPER GI W KUB: ICD-10-PCS | Mod: 26,,, | Performed by: RADIOLOGY

## 2019-10-11 ENCOUNTER — PATIENT MESSAGE (OUTPATIENT)
Dept: GASTROENTEROLOGY | Facility: CLINIC | Age: 44
End: 2019-10-11

## 2019-10-16 NOTE — H&P (VIEW-ONLY)
CRS Office Visit History and Physical    Referring Md:   Marce Womack Md  [address]  [address]   [place] [address]    SUBJECTIVE:     Chief Complaint:  Diverticulitis    History of Present Illness:  The patient is new patient to this practice.   Course is as follows:  Patient is a 43 y.o. male presents with diverticulitis.  He was seen in evaluation surgery Clinic for an ventral hernia. CT scan was performed and demonstrated incidental finding of acute diverticulitis.  He states that he has had 3-4 episodes of documented acute diverticulitis (with CT scan) and abx, with the last episode being one year ago.  First episode was in 2016.  Had a CT that demonstrated diverticulitis and was treated with outpatient abx.   Recurrent episode in 2018 in [place], [place].  Again treated with outpatient abx.    Few episodes between that were less severe and did not prompt a visit to the ER.  He improved with changing his diet to liquids.   In October, he had another flare of diverticulitis and has improved slightly with PO abx.     Functionally, he complains of diarrhea. He has 8 BM per day.  This is improving with probiotics.  Diarrhea worsened over the past year. No fecal incontinence.  No family history of colon or rectal cancer or IBD.  No prior abdominal surgeries.     He continues to have pain in his suprapubic area.  No fevers or chills.     10/5/2019:  CT of the abdomen pelvis demonstrates 8-10 cm inflamed segment of the mid sigmoid colon.  Consistent with diverticulitis.    Last Colonoscopy: 8/20/19:  Impression:           - The examined portion of the ileum was normal.                        - Severe diverticulosis in the left colon. There                         was evidence of an impacted diverticulum.                        - Stool in the entire examined colon.                        - The entire examined colon is normal. Biopsied.                        - Internal hemorrhoids.    Review of patient's  allergies indicates:   Allergen Reactions    Sulfa (sulfonamide antibiotics) Hives       Past Medical History:   Diagnosis Date    Abdominal hernia     Hepatitis C 2018    Hiatal hernia 2018    High cholesterol 2018    History of kidney stones     Hypertension 2018     Past Surgical History:   Procedure Laterality Date    COLONOSCOPY N/A 8/20/2019    Procedure: COLONOSCOPY;  Surgeon: Marce Womack MD;  Location: [facility] ENDO;  Service: Endoscopy;  Laterality: N/A;  Can not arrive before 8 am    EXTRACORPOREAL SHOCK WAVE LITHOTRIPSY      GASTROSCOPY      Inginal Hernia  1978    possible appendix removal at this time??     Family History   Problem Relation Age of Onset    Fibromyalgia Mother     Heart failure Mother     Hypertension Mother     Diabetes Mother     Heart failure Maternal Grandmother     Stroke Paternal Grandmother     Stroke Paternal Grandfather      Social History     Tobacco Use    Smoking status: Former Smoker    Smokeless tobacco: Never Used    Tobacco comment: quit 2018   Substance Use Topics    Alcohol use: Not Currently    Drug use: Yes     Types: Marijuana     Comment: last smoked 1 month ago        Review of Systems:  Review of Systems   Constitutional: Negative for chills, diaphoresis, fever, malaise/fatigue and weight loss.   HENT: Negative for congestion.    Respiratory: Negative for shortness of breath.    Cardiovascular: Negative for chest pain and leg swelling.   Gastrointestinal: Positive for abdominal pain, diarrhea and heartburn. Negative for blood in stool, constipation, nausea and vomiting.   Genitourinary: Negative for dysuria.   Musculoskeletal: Negative for back pain and myalgias.   Skin: Negative for rash.   Neurological: Negative for dizziness and weakness.   Endo/Heme/Allergies: Does not bruise/bleed easily.   Psychiatric/Behavioral: Negative for depression.       OBJECTIVE:     Vital Signs (Most Recent)  Wt 113 kg (249 lb 1.9 oz)   BMI 34.75 kg/m²      Physical Exam:  General: White male in no distress   Neuro: alert and oriented x 4.  Moves all extremities.     HEENT: no icterus.  Trachea midline  Respiratory: respirations are even and unlabored  Cardiac: regular rate  Abdomen: soft, mildly tender to deep palpation in the suprapubic region.  Abdominal wall diastasis  Extremities: Warm dry and intact  Skin: no rashes  Anorectal: deferred    Labs:  H&H 15 and 45.  Albumin 4.5.  Normal renal function.    Imaging:  CT abdomen pelvis from 10/05/2019 personally reviewed shows long segment diverticulitis of the sigmoid colon.      ASSESSMENT/PLAN:     Diagnoses and all orders for this visit:    Diverticulitis of large intestine without perforation or abscess without bleeding  -     Case Request Operating Room: COLECTOMY, SIGMOID, LAPAROSCOPIC, flex sig, ERAS low    Other orders  -     hyoscyamine (ANASPAZ,LEVSIN) 0.125 mg Tab; Take 1 tablet (125 mcg total) by mouth every 4 (four) hours as needed.        42 y/o M with recurrent diverticulitis with 3 documented episodes.  All episodes have been uncomplicated.  However, he has a 8-10cm long segment with stricture.   We discussed recurrence of diverticulitis with one episode having a 25-30% recurrence rate, 2 episodes having 50-60% recurrence rate, and 3 episodes having 90% recurrence rate.  We discussed that the first episode of diverticulitis is usually the most complicated and that subsequent episodes are similar to this.    We discussed the role of elective colectomy to reduce the rate to subsequent diverticulitis to 3-5%.  The colon is removed from the most proximal area of diverticulitis as seen on the CT scans to the top of the rectum.  The risks of surgery were covered, including bleeding, anastomotic leak (3-5%), infection, abscess, hernia formation, and the need for potential stoma if a leak did occur.  The typical post operative course was covered with a 3-5 day hospital admission.  Following colectomy, bowel  habits are often more frequent but will often adapt over time.    Given the recurrent nature, his young age, and the long segment, colectomy was offered.  He wishes to proceed.  Consents were signed today and all questions were answered.  We will plan to start with a phannenstiel for hand port. He feels that he has an upper abdominal wall hernia.  Diastasis was seen on his CT. His anterior abdominal wall will be evaluated and primary closure can be attempted of any small hernia.  Hiatal hernia repair would not be offered at the same time due to the infection risk.  He will follow up with Dr. Montanez after his recovery from his colectomy.          TOSHIA Somers MD  Staff Surgeon  Colon & Rectal Surgery

## 2019-10-16 NOTE — PROGRESS NOTES
CRS Office Visit History and Physical    Referring Md:   Marce Womack Md  [address]  [address]   [place] [address]    SUBJECTIVE:     Chief Complaint:  Diverticulitis    History of Present Illness:  The patient is new patient to this practice.   Course is as follows:  Patient is a 43 y.o. male presents with diverticulitis.  He was seen in evaluation surgery Clinic for an ventral hernia. CT scan was performed and demonstrated incidental finding of acute diverticulitis.  He states that he has had 3-4 episodes of documented acute diverticulitis (with CT scan) and abx, with the last episode being one year ago.  First episode was in 2016.  Had a CT that demonstrated diverticulitis and was treated with outpatient abx.   Recurrent episode in 2018 in [place], [place].  Again treated with outpatient abx.    Few episodes between that were less severe and did not prompt a visit to the ER.  He improved with changing his diet to liquids.   In October, he had another flare of diverticulitis and has improved slightly with PO abx.     Functionally, he complains of diarrhea. He has 8 BM per day.  This is improving with probiotics.  Diarrhea worsened over the past year. No fecal incontinence.  No family history of colon or rectal cancer or IBD.  No prior abdominal surgeries.     He continues to have pain in his suprapubic area.  No fevers or chills.     10/5/2019:  CT of the abdomen pelvis demonstrates 8-10 cm inflamed segment of the mid sigmoid colon.  Consistent with diverticulitis.    Last Colonoscopy: 8/20/19:  Impression:           - The examined portion of the ileum was normal.                        - Severe diverticulosis in the left colon. There                         was evidence of an impacted diverticulum.                        - Stool in the entire examined colon.                        - The entire examined colon is normal. Biopsied.                        - Internal hemorrhoids.    Review of patient's  allergies indicates:   Allergen Reactions    Sulfa (sulfonamide antibiotics) Hives       Past Medical History:   Diagnosis Date    Abdominal hernia     Hepatitis C 2018    Hiatal hernia 2018    High cholesterol 2018    History of kidney stones     Hypertension 2018     Past Surgical History:   Procedure Laterality Date    COLONOSCOPY N/A 8/20/2019    Procedure: COLONOSCOPY;  Surgeon: Marce Womack MD;  Location: [facility] END[facility];  Service: Endoscopy;  Laterality: N/A;  Can not arrive before 8 am    EXTRACORPOREAL SHOCK WAVE LITHOTRIPSY      GASTROSCOPY      Inginal Hernia  1978    possible appendix removal at this time??     Family History   Problem Relation Age of Onset    Fibromyalgia Mother     Heart failure Mother     Hypertension Mother     Diabetes Mother     Heart failure Maternal Grandmother     Stroke Paternal Grandmother     Stroke Paternal Grandfather      Social History     Tobacco Use    Smoking status: Former Smoker    Smokeless tobacco: Never Used    Tobacco comment: quit 2018   Substance Use Topics    Alcohol use: Not Currently    Drug use: Yes     Types: Marijuana     Comment: last smoked 1 month ago        Review of Systems:  Review of Systems   Constitutional: Negative for chills, diaphoresis, fever, malaise/fatigue and weight loss.   HENT: Negative for congestion.    Respiratory: Negative for shortness of breath.    Cardiovascular: Negative for chest pain and leg swelling.   Gastrointestinal: Positive for abdominal pain, diarrhea and heartburn. Negative for blood in stool, constipation, nausea and vomiting.   Genitourinary: Negative for dysuria.   Musculoskeletal: Negative for back pain and myalgias.   Skin: Negative for rash.   Neurological: Negative for dizziness and weakness.   Endo/Heme/Allergies: Does not bruise/bleed easily.   Psychiatric/Behavioral: Negative for depression.       OBJECTIVE:     Vital Signs (Most Recent)  Wt 113 kg (249 lb 1.9 oz)   BMI 34.75 kg/m²      Physical Exam:  General: White male in no distress   Neuro: alert and oriented x 4.  Moves all extremities.     HEENT: no icterus.  Trachea midline  Respiratory: respirations are even and unlabored  Cardiac: regular rate  Abdomen: soft, mildly tender to deep palpation in the suprapubic region.  Abdominal wall diastasis  Extremities: Warm dry and intact  Skin: no rashes  Anorectal: deferred    Labs:  H&H 15 and 45.  Albumin 4.5.  Normal renal function.    Imaging:  CT abdomen pelvis from 10/05/2019 personally reviewed shows long segment diverticulitis of the sigmoid colon.      ASSESSMENT/PLAN:     Diagnoses and all orders for this visit:    Diverticulitis of large intestine without perforation or abscess without bleeding  -     Case Request Operating Room: COLECTOMY, SIGMOID, LAPAROSCOPIC, flex sig, ERAS low    Other orders  -     hyoscyamine (ANASPAZ,LEVSIN) 0.125 mg Tab; Take 1 tablet (125 mcg total) by mouth every 4 (four) hours as needed.        42 y/o M with recurrent diverticulitis with 3 documented episodes.  All episodes have been uncomplicated.  However, he has a 8-10cm long segment with stricture.   We discussed recurrence of diverticulitis with one episode having a 25-30% recurrence rate, 2 episodes having 50-60% recurrence rate, and 3 episodes having 90% recurrence rate.  We discussed that the first episode of diverticulitis is usually the most complicated and that subsequent episodes are similar to this.    We discussed the role of elective colectomy to reduce the rate to subsequent diverticulitis to 3-5%.  The colon is removed from the most proximal area of diverticulitis as seen on the CT scans to the top of the rectum.  The risks of surgery were covered, including bleeding, anastomotic leak (3-5%), infection, abscess, hernia formation, and the need for potential stoma if a leak did occur.  The typical post operative course was covered with a 3-5 day hospital admission.  Following colectomy, bowel  habits are often more frequent but will often adapt over time.    Given the recurrent nature, his young age, and the long segment, colectomy was offered.  He wishes to proceed.  Consents were signed today and all questions were answered.  We will plan to start with a phannenstiel for hand port. He feels that he has an upper abdominal wall hernia.  Diastasis was seen on his CT. His anterior abdominal wall will be evaluated and primary closure can be attempted of any small hernia.  Hiatal hernia repair would not be offered at the same time due to the infection risk.  He will follow up with Dr. Montanez after his recovery from his colectomy.          TOSHIA Somers MD  Staff Surgeon  Colon & Rectal Surgery

## 2019-10-17 ENCOUNTER — OFFICE VISIT (OUTPATIENT)
Dept: SURGERY | Facility: CLINIC | Age: 44
End: 2019-10-17
Payer: MEDICAID

## 2019-10-17 VITALS — WEIGHT: 249.13 LBS | BODY MASS INDEX: 34.75 KG/M2

## 2019-10-17 DIAGNOSIS — Z01.818 PRE-OP EVALUATION: Primary | ICD-10-CM

## 2019-10-17 DIAGNOSIS — K57.32 DIVERTICULITIS OF LARGE INTESTINE WITHOUT PERFORATION OR ABSCESS WITHOUT BLEEDING: Primary | ICD-10-CM

## 2019-10-17 PROCEDURE — 99204 PR OFFICE/OUTPT VISIT, NEW, LEVL IV, 45-59 MIN: ICD-10-PCS | Mod: S$PBB,,, | Performed by: COLON & RECTAL SURGERY

## 2019-10-17 PROCEDURE — 99999 PR PBB SHADOW E&M-EST. PATIENT-LVL III: ICD-10-PCS | Mod: PBBFAC,,, | Performed by: COLON & RECTAL SURGERY

## 2019-10-17 PROCEDURE — 99999 PR PBB SHADOW E&M-EST. PATIENT-LVL III: CPT | Mod: PBBFAC,,, | Performed by: COLON & RECTAL SURGERY

## 2019-10-17 PROCEDURE — 99204 OFFICE O/P NEW MOD 45 MIN: CPT | Mod: S$PBB,,, | Performed by: COLON & RECTAL SURGERY

## 2019-10-17 PROCEDURE — 99213 OFFICE O/P EST LOW 20 MIN: CPT | Mod: PBBFAC,PN | Performed by: COLON & RECTAL SURGERY

## 2019-10-17 RX ORDER — NEOMYCIN SULFATE 500 MG/1
TABLET ORAL
Qty: 6 TABLET | Refills: 0 | Status: ON HOLD | OUTPATIENT
Start: 2019-10-17 | End: 2019-11-04

## 2019-10-17 RX ORDER — HYOSCYAMINE SULFATE 0.125 MG
125 TABLET ORAL EVERY 4 HOURS PRN
Qty: 40 TABLET | Refills: 5 | Status: SHIPPED | OUTPATIENT
Start: 2019-10-17 | End: 2019-10-21

## 2019-10-17 RX ORDER — METRONIDAZOLE 500 MG/1
TABLET ORAL
Qty: 3 TABLET | Refills: 0 | Status: ON HOLD | OUTPATIENT
Start: 2019-10-17 | End: 2019-11-04

## 2019-10-17 NOTE — LETTER
October 17, 2019      Marce Womack MD  [address]  [address] [place] [id number]           [facility] Rectal [facility]  [address] [place] [address], [address]  [facility] [address]  Phone: [phone]  Fax: [phone]          Patient: You Seals   MR Number: [id number]   YOB: 1975   Date of Visit: 10/17/2019       Dear Dr. Marce Womack:    Thank you for referring You Seals to me for evaluation. Attached you will find relevant portions of my assessment and plan of care.    If you have questions, please do not hesitate to call me. I look forward to following You Seals along with you.    Sincerely,    Loi Somers MD    Enclosure  CC:  No Recipients    If you would like to receive this communication electronically, please contact [email or web address] or [phone] to request more information on [facility] Link access.    For providers and/or their staff who would like to refer a patient to [facility], please contact us through our one-stop-shop provider referral line, [facility], at [phone].    If you feel you have received this communication in error or would no longer like to receive these types of communications, please e-mail [email or web address]

## 2019-10-21 ENCOUNTER — PATIENT MESSAGE (OUTPATIENT)
Dept: SURGERY | Facility: HOSPITAL | Age: 44
End: 2019-10-21

## 2019-10-21 DIAGNOSIS — K57.32 DIVERTICULITIS OF LARGE INTESTINE WITHOUT PERFORATION OR ABSCESS WITHOUT BLEEDING: Primary | ICD-10-CM

## 2019-10-21 DIAGNOSIS — Z01.818 PRE-OP EVALUATION: ICD-10-CM

## 2019-10-21 RX ORDER — DICYCLOMINE HYDROCHLORIDE 20 MG/1
20 TABLET ORAL 3 TIMES DAILY PRN
Qty: 60 TABLET | Refills: 2 | Status: SHIPPED | OUTPATIENT
Start: 2019-10-21 | End: 2019-10-24

## 2019-10-24 ENCOUNTER — TELEPHONE (OUTPATIENT)
Dept: SURGERY | Facility: CLINIC | Age: 44
End: 2019-10-24

## 2019-10-24 RX ORDER — DICYCLOMINE HYDROCHLORIDE 20 MG/1
20 TABLET ORAL EVERY 6 HOURS
Qty: 120 TABLET | Refills: 0 | Status: SHIPPED | OUTPATIENT
Start: 2019-10-24 | End: 2019-11-23

## 2019-10-24 NOTE — TELEPHONE ENCOUNTER
Left message to let him know where his rx for Bentyl went to. Asked that he message me back if it should be at a different pharmacy.

## 2019-10-28 ENCOUNTER — CLINICAL SUPPORT (OUTPATIENT)
Dept: INFECTIOUS DISEASES | Facility: CLINIC | Age: 44
End: 2019-10-28
Payer: MEDICAID

## 2019-10-28 ENCOUNTER — OFFICE VISIT (OUTPATIENT)
Dept: HEPATOLOGY | Facility: CLINIC | Age: 44
End: 2019-10-28
Payer: MEDICAID

## 2019-10-28 VITALS
WEIGHT: 250.25 LBS | RESPIRATION RATE: 16 BRPM | BODY MASS INDEX: 35.03 KG/M2 | DIASTOLIC BLOOD PRESSURE: 89 MMHG | TEMPERATURE: 98 F | HEIGHT: 71 IN | HEART RATE: 69 BPM | SYSTOLIC BLOOD PRESSURE: 142 MMHG

## 2019-10-28 DIAGNOSIS — R74.8 ABNORMAL TRANSAMINASES: ICD-10-CM

## 2019-10-28 DIAGNOSIS — B18.2 CHRONIC HEPATITIS C WITHOUT HEPATIC COMA: Primary | ICD-10-CM

## 2019-10-28 PROCEDURE — 99213 OFFICE O/P EST LOW 20 MIN: CPT | Mod: S$PBB,,, | Performed by: PHYSICIAN ASSISTANT

## 2019-10-28 PROCEDURE — 99214 OFFICE O/P EST MOD 30 MIN: CPT | Mod: PBBFAC,25 | Performed by: PHYSICIAN ASSISTANT

## 2019-10-28 PROCEDURE — 99999 PR PBB SHADOW E&M-EST. PATIENT-LVL IV: ICD-10-PCS | Mod: PBBFAC,,, | Performed by: PHYSICIAN ASSISTANT

## 2019-10-28 PROCEDURE — 90471 IMMUNIZATION ADMIN: CPT | Mod: PBBFAC

## 2019-10-28 PROCEDURE — 99999 PR PBB SHADOW E&M-EST. PATIENT-LVL IV: CPT | Mod: PBBFAC,,, | Performed by: PHYSICIAN ASSISTANT

## 2019-10-28 PROCEDURE — 99213 PR OFFICE/OUTPT VISIT, EST, LEVL III, 20-29 MIN: ICD-10-PCS | Mod: S$PBB,,, | Performed by: PHYSICIAN ASSISTANT

## 2019-10-28 NOTE — PROGRESS NOTES
"HEPATOLOGY CLINIC VISIT NOTE - HCV clinic    REFERRING PROVIDER: Marce Womack MD     CHIEF COMPLAINT: Hepatitis C   (here w/ significant other)    HISTORY: This is a 43 y.o. White male with chronic hepatitis C, here for f/u.    Originally diagnosed about a year ago. Seen by a specialist at that time. Recalls having a fibroscan and was prescribed 8 weeks of mavyret. Did not complete therapy b/c he moved - unclear how much he took but sounds as if less than 4 weeks. Ultimately current HCV RNA (+). Denies new risk factor since mavyret use so I assume this is relapse and not new infection.    Risks for HCV:  Numerous tattoos: first one age 19    IVDA: first use 2005, 4 yrs clean now    Intranasal drug use: first use 2005, 4 yrs clean now    No occupational exposure    - Genotype 1a (8/2019)  - NS5A resistance not known  - HCV ,000 IU/mL (7/2019)  - Prior HCV treatment: partial treatment w/ Mavyret (several weeks) - presumed relapse since still viremic    Liver staging:  FibroScan - outside provider ~ 1 year ago. Results not avail to me  HCV FibroSURE A0, F0 - 8/2019    CT 12/2018 - unremarkable liver / spleen  Labs and imaging reveal well preserved liver function w/o concern for advanced fibrosis:  Labs 7/2019  FIB-4 -- 0.95; Cirrhosis less likely  APRI -- 0.74; Cirrhosis less likely      Here for f/u w/ additional labs   Lacking immunity to HAV & HBV   NS5A resistance test not run b/c "viral load was too low"   I was unable to get copy of prior fibroscan results    HCV RNA (7/18/19) 298,865 IU/mL --> (8/20/19) 781 IU/mL --> (9/27/19) viral load too low to run resistance test    Feels well  Motivated to have HCV treated  Denies jaundice, dark urine, hematemesis, melena, slowed mentation, abdominal distention.                     Past Medical History:   Diagnosis Date    Abdominal hernia     Hepatitis C 2018    Hiatal hernia 2018    High cholesterol 2018    History of kidney stones     Hypertension " 2018     Past Surgical History:   Procedure Laterality Date    COLONOSCOPY N/A 8/20/2019    Procedure: COLONOSCOPY;  Surgeon: Marce Womack MD;  Location: [facility];  Service: Endoscopy;  Laterality: N/A;  Can not arrive before 8 am    EXTRACORPOREAL SHOCK WAVE LITHOTRIPSY      GASTROSCOPY      Inginal Hernia  1978    possible appendix removal at this time??       FAMILY HISTORY: Negative for liver disease    SOCIAL HISTORY:   Works as [employer]. Previously at [employer] but now on his own.  Social History     Tobacco Use   Smoking Status Former Smoker   Smokeless Tobacco Never Used   Tobacco Comment    quit 2018     Alcohol - no heavy. none  Drugs - prior IV and intranasal drug use. First use 2005. Clean since 2015      ROS:   No fever, chills, weight loss, fatigue  No chest pain, palpitations, dyspnea, cough  No abdominal pain, nausea, vomiting, (+) GERD  No skin rashes   No headaches  No depression or anxiety      PHYSICAL EXAM:  Friendly White male, in no acute distress; alert and oriented to person, place and time  VITALS: reviewed  HEENT: Sclerae anicteric.   NECK: Supple  LUNGS: Normal respiratory effort.   ABDOMEN: Flat, soft, nontender.    SKIN: Warm and dry. No jaundice, No obvious rashes. Numerous tattoos  NEURO/PSYCH: Normal gate. Memory intact. Thought and speech pattern appropriate. Behavior normal. No depression or anxiety noted.    RECENT LABS:  Lab Results   Component Value Date    WBC 5.18 07/18/2019    HGB 15.2 07/18/2019     07/18/2019     Lab Results   Component Value Date    INR 1.0 08/20/2019     Lab Results   Component Value Date    AST 91 (H) 07/18/2019     (H) 07/18/2019    BILITOT 0.6 07/18/2019    ALBUMIN 4.5 07/18/2019    ALKPHOS 96 07/18/2019    CREATININE 1.1 07/18/2019    BUN 23 (H) 07/18/2019     07/18/2019    K 4.2 07/18/2019    AFP 2.5 08/20/2019         RECENT IMAGING:  CT abdomen w/ IV contrast 12/2018:      ASSESSMENT  43 y.o. White male with:  1.  CHRONIC HEPATITIS C, GENOTYPE 1a   -- Prior HCV treatment - partial treatment w/ mavyret, still viremic on prior labs   -- Hx of elevated transaminases on last labs  -- lacking Immunity to HAV & HBV    2. PRIOR DRUG USE  -- clean x 4 yrs      EDUCATION:  Discussed downward trend of HCV RNA. Need to confirm if still viremic before moving forward w/ HCV rx.    Discussed goal of HCV eradication to prevent progression of liver disease.  Discussed use of Vosevi daily x 12 weeks with food w/ potential side effects of fatigue, headache    Reviewed limitations on acid suppressant medications due to DDI w/ Vosevi:  -- Antacids - can use TUMS, must be  from Vosevi by 4 hours  -- H2 Receptor Antagonist - not taking  -- PPI - Pt currently on omeprazole 20mg PRN. Can continue w/ vosevi, taken only once daily  Patient instructed to contact me if experiencing acid related symptoms so further recommendations can be made regarding acid suppression therapy.      Herbal / alternative therapies must be discontinued  Discussed importance of medication adherence and risk of treatment failure / viral resistance if not adherent. Pt has verbalized understanding.        PLAN:  HCV RNA, CMP, INR today  Twinrix vaccine series  If still viremic and appears pt not clearing virus on his own will move forward w/ 12 weeks Vosvei

## 2019-10-31 ENCOUNTER — TELEPHONE (OUTPATIENT)
Dept: HEPATOLOGY | Facility: CLINIC | Age: 44
End: 2019-10-31

## 2019-10-31 DIAGNOSIS — Z86.19 HISTORY OF HEPATITIS C: Primary | ICD-10-CM

## 2019-10-31 NOTE — TELEPHONE ENCOUNTER
10/28/19 labs:  CMP normal  HCV negative    pls schedule HCV RNA in 6 weeks  Thanks    Pt notified via [facility]

## 2019-11-01 ENCOUNTER — TELEPHONE (OUTPATIENT)
Dept: SURGERY | Facility: CLINIC | Age: 44
End: 2019-11-01

## 2019-11-01 NOTE — TELEPHONE ENCOUNTER
----- Message from Laurie Dey sent at 11/1/2019  1:16 PM CDT -----  Contact: You  Mr. Seals needs his surgery arrival time for Monday. He can be reached at [phone]

## 2019-11-01 NOTE — PRE-PROCEDURE INSTRUCTIONS
PREOP INSTRUCTIONS:No solid food ,milk or milk products for 8 hours prior to procedure.Clear liquids are allowed up to 2 hours before arrival time.Clear liquids are:water,apple juice,pedialyte,gatorade,& jello.Patient instructed to follow surgeon's dietary guidelines if they differ from these.Shower instructions as well as directions to the [facility] were given.Patient encouraged to wear loose fitting,comfortable clothing.Medication instructions for pm prior to and am of procedure reviewed.Instructed patient to avoid taking vitamins,supplements,aspirin and ibuprofen the morning of surgery.Patient stated an understanding.    Patient denies any side effects or issues with anesthesia or sedation.    Patient does not know arrival time.Explained that this information comes from the surgeon's office and if they haven't heard from them by 2 or 3 pm to call the office.Patient stated an understanding.

## 2019-11-04 ENCOUNTER — HOSPITAL ENCOUNTER (INPATIENT)
Facility: HOSPITAL | Age: 44
LOS: 2 days | Discharge: HOME | DRG: 331 | End: 2019-11-06
Attending: COLON & RECTAL SURGERY | Admitting: COLON & RECTAL SURGERY
Payer: MEDICAID

## 2019-11-04 ENCOUNTER — ANESTHESIA (OUTPATIENT)
Dept: SURGERY | Facility: HOSPITAL | Age: 44
DRG: 331 | End: 2019-11-04
Payer: MEDICAID

## 2019-11-04 ENCOUNTER — ANESTHESIA EVENT (OUTPATIENT)
Dept: SURGERY | Facility: HOSPITAL | Age: 44
DRG: 331 | End: 2019-11-04
Payer: MEDICAID

## 2019-11-04 DIAGNOSIS — K57.92 DIVERTICULITIS: ICD-10-CM

## 2019-11-04 DIAGNOSIS — B18.2 CHRONIC HEPATITIS C WITHOUT HEPATIC COMA: Primary | ICD-10-CM

## 2019-11-04 LAB
ABO + RH BLD: NORMAL
BLD GP AB SCN CELLS X3 SERPL QL: NORMAL

## 2019-11-04 PROCEDURE — 44207 L COLECTOMY/COLOPROCTOSTOMY: CPT | Mod: ,,, | Performed by: COLON & RECTAL SURGERY

## 2019-11-04 PROCEDURE — 63600175 PHARM REV CODE 636 W HCPCS: Performed by: NURSE ANESTHETIST, CERTIFIED REGISTERED

## 2019-11-04 PROCEDURE — 63600175 PHARM REV CODE 636 W HCPCS: Performed by: ANESTHESIOLOGY

## 2019-11-04 PROCEDURE — 88307 TISSUE EXAM BY PATHOLOGIST: CPT | Mod: 26,,, | Performed by: PATHOLOGY

## 2019-11-04 PROCEDURE — 94799 UNLISTED PULMONARY SVC/PX: CPT

## 2019-11-04 PROCEDURE — C9290 INJ, BUPIVACAINE LIPOSOME: HCPCS | Performed by: COLON & RECTAL SURGERY

## 2019-11-04 PROCEDURE — 25000003 PHARM REV CODE 250: Performed by: NURSE ANESTHETIST, CERTIFIED REGISTERED

## 2019-11-04 PROCEDURE — 71000039 HC RECOVERY, EACH ADD'L HOUR: Performed by: COLON & RECTAL SURGERY

## 2019-11-04 PROCEDURE — 63600175 PHARM REV CODE 636 W HCPCS: Performed by: STUDENT IN AN ORGANIZED HEALTH CARE EDUCATION/TRAINING PROGRAM

## 2019-11-04 PROCEDURE — S0030 INJECTION, METRONIDAZOLE: HCPCS | Performed by: NURSE PRACTITIONER

## 2019-11-04 PROCEDURE — 44213 LAP MOBIL SPLENIC FL ADD-ON: CPT | Mod: ,,, | Performed by: COLON & RECTAL SURGERY

## 2019-11-04 PROCEDURE — 63600175 PHARM REV CODE 636 W HCPCS: Performed by: NURSE PRACTITIONER

## 2019-11-04 PROCEDURE — 31500 INSERT EMERGENCY AIRWAY: CPT | Performed by: NURSE ANESTHETIST, CERTIFIED REGISTERED

## 2019-11-04 PROCEDURE — 88302 TISSUE EXAM BY PATHOLOGIST: CPT | Performed by: PATHOLOGY

## 2019-11-04 PROCEDURE — 94761 N-INVAS EAR/PLS OXIMETRY MLT: CPT

## 2019-11-04 PROCEDURE — 36000711: Performed by: COLON & RECTAL SURGERY

## 2019-11-04 PROCEDURE — 37000009 HC ANESTHESIA EA ADD 15 MINS: Performed by: COLON & RECTAL SURGERY

## 2019-11-04 PROCEDURE — 71000033 HC RECOVERY, INTIAL HOUR: Performed by: COLON & RECTAL SURGERY

## 2019-11-04 PROCEDURE — 86850 RBC ANTIBODY SCREEN: CPT

## 2019-11-04 PROCEDURE — 63600175 PHARM REV CODE 636 W HCPCS

## 2019-11-04 PROCEDURE — D9220A PRA ANESTHESIA: ICD-10-PCS | Mod: ANES,,, | Performed by: ANESTHESIOLOGY

## 2019-11-04 PROCEDURE — 44207 PR LAP,SURG,COLECTOMY,W/ANAST: ICD-10-PCS | Mod: ,,, | Performed by: COLON & RECTAL SURGERY

## 2019-11-04 PROCEDURE — 25000003 PHARM REV CODE 250: Performed by: NURSE PRACTITIONER

## 2019-11-04 PROCEDURE — 88302 TISSUE EXAM BY PATHOLOGIST: CPT | Mod: 26,,, | Performed by: PATHOLOGY

## 2019-11-04 PROCEDURE — 88307 TISSUE SPECIMEN TO PATHOLOGY - SURGERY: ICD-10-PCS | Mod: 26,,, | Performed by: PATHOLOGY

## 2019-11-04 PROCEDURE — 63600175 PHARM REV CODE 636 W HCPCS: Performed by: COLON & RECTAL SURGERY

## 2019-11-04 PROCEDURE — 20600001 HC STEP DOWN PRIVATE ROOM

## 2019-11-04 PROCEDURE — D9220A PRA ANESTHESIA: Mod: ANES,,, | Performed by: ANESTHESIOLOGY

## 2019-11-04 PROCEDURE — 88302 TISSUE SPECIMEN TO PATHOLOGY - SURGERY: ICD-10-PCS | Mod: 26,,, | Performed by: PATHOLOGY

## 2019-11-04 PROCEDURE — 36000710: Performed by: COLON & RECTAL SURGERY

## 2019-11-04 PROCEDURE — D9220A PRA ANESTHESIA: ICD-10-PCS | Mod: CRNA,,, | Performed by: NURSE ANESTHETIST, CERTIFIED REGISTERED

## 2019-11-04 PROCEDURE — 25000003 PHARM REV CODE 250: Performed by: STUDENT IN AN ORGANIZED HEALTH CARE EDUCATION/TRAINING PROGRAM

## 2019-11-04 PROCEDURE — 27201423 OPTIME MED/SURG SUP & DEVICES STERILE SUPPLY: Performed by: COLON & RECTAL SURGERY

## 2019-11-04 PROCEDURE — D9220A PRA ANESTHESIA: Mod: CRNA,,, | Performed by: NURSE ANESTHETIST, CERTIFIED REGISTERED

## 2019-11-04 PROCEDURE — 44213 PR LAP, SURG MOBIL SPLENIC FL DUR PTL COLECTOMY: ICD-10-PCS | Mod: ,,, | Performed by: COLON & RECTAL SURGERY

## 2019-11-04 PROCEDURE — 37000008 HC ANESTHESIA 1ST 15 MINUTES: Performed by: COLON & RECTAL SURGERY

## 2019-11-04 RX ORDER — IBUPROFEN 400 MG/1
800 TABLET ORAL EVERY 8 HOURS
Status: DISCONTINUED | OUTPATIENT
Start: 2019-11-05 | End: 2019-11-06 | Stop reason: HOSPADM

## 2019-11-04 RX ORDER — EPHEDRINE SULFATE 50 MG/ML
INJECTION, SOLUTION INTRAVENOUS
Status: DISCONTINUED | OUTPATIENT
Start: 2019-11-04 | End: 2019-11-04

## 2019-11-04 RX ORDER — ONDANSETRON 2 MG/ML
INJECTION INTRAMUSCULAR; INTRAVENOUS
Status: DISCONTINUED | OUTPATIENT
Start: 2019-11-04 | End: 2019-11-04

## 2019-11-04 RX ORDER — LIDOCAINE HYDROCHLORIDE 10 MG/ML
1 INJECTION, SOLUTION EPIDURAL; INFILTRATION; INTRACAUDAL; PERINEURAL
Status: COMPLETED | OUTPATIENT
Start: 2019-11-04 | End: 2019-11-04

## 2019-11-04 RX ORDER — METRONIDAZOLE 500 MG/100ML
500 INJECTION, SOLUTION INTRAVENOUS
Status: COMPLETED | OUTPATIENT
Start: 2019-11-04 | End: 2019-11-04

## 2019-11-04 RX ORDER — ENOXAPARIN SODIUM 100 MG/ML
40 INJECTION SUBCUTANEOUS EVERY 24 HOURS
Status: DISCONTINUED | OUTPATIENT
Start: 2019-11-05 | End: 2019-11-06 | Stop reason: HOSPADM

## 2019-11-04 RX ORDER — KETAMINE HCL IN 0.9 % NACL 50 MG/5 ML
SYRINGE (ML) INTRAVENOUS
Status: DISCONTINUED | OUTPATIENT
Start: 2019-11-04 | End: 2019-11-04

## 2019-11-04 RX ORDER — OXYCODONE HYDROCHLORIDE 5 MG/1
5 TABLET ORAL EVERY 4 HOURS PRN
Status: DISCONTINUED | OUTPATIENT
Start: 2019-11-04 | End: 2019-11-06 | Stop reason: HOSPADM

## 2019-11-04 RX ORDER — FENTANYL CITRATE 50 UG/ML
INJECTION, SOLUTION INTRAMUSCULAR; INTRAVENOUS
Status: COMPLETED
Start: 2019-11-04 | End: 2019-11-04

## 2019-11-04 RX ORDER — SODIUM CHLORIDE 9 MG/ML
INJECTION, SOLUTION INTRAVENOUS CONTINUOUS
Status: DISCONTINUED | OUTPATIENT
Start: 2019-11-04 | End: 2019-11-05

## 2019-11-04 RX ORDER — MUPIROCIN 20 MG/G
1 OINTMENT TOPICAL 2 TIMES DAILY
Status: DISCONTINUED | OUTPATIENT
Start: 2019-11-04 | End: 2019-11-06 | Stop reason: HOSPADM

## 2019-11-04 RX ORDER — PANTOPRAZOLE SODIUM 40 MG/1
40 TABLET, DELAYED RELEASE ORAL DAILY
Status: DISCONTINUED | OUTPATIENT
Start: 2019-11-05 | End: 2019-11-06 | Stop reason: HOSPADM

## 2019-11-04 RX ORDER — FENTANYL CITRATE 50 UG/ML
25 INJECTION, SOLUTION INTRAMUSCULAR; INTRAVENOUS EVERY 5 MIN PRN
Status: COMPLETED | OUTPATIENT
Start: 2019-11-04 | End: 2019-11-04

## 2019-11-04 RX ORDER — DEXAMETHASONE SODIUM PHOSPHATE 4 MG/ML
INJECTION, SOLUTION INTRA-ARTICULAR; INTRALESIONAL; INTRAMUSCULAR; INTRAVENOUS; SOFT TISSUE
Status: DISCONTINUED | OUTPATIENT
Start: 2019-11-04 | End: 2019-11-04

## 2019-11-04 RX ORDER — ACETAMINOPHEN 10 MG/ML
INJECTION, SOLUTION INTRAVENOUS
Status: DISCONTINUED | OUTPATIENT
Start: 2019-11-04 | End: 2019-11-04

## 2019-11-04 RX ORDER — MIDAZOLAM HYDROCHLORIDE 1 MG/ML
INJECTION, SOLUTION INTRAMUSCULAR; INTRAVENOUS
Status: DISCONTINUED | OUTPATIENT
Start: 2019-11-04 | End: 2019-11-04

## 2019-11-04 RX ORDER — ALVIMOPAN 12 MG/1
12 CAPSULE ORAL 2 TIMES DAILY
Status: DISCONTINUED | OUTPATIENT
Start: 2019-11-04 | End: 2019-11-06 | Stop reason: HOSPADM

## 2019-11-04 RX ORDER — ACETAMINOPHEN 500 MG
1000 TABLET ORAL EVERY 8 HOURS
Status: DISCONTINUED | OUTPATIENT
Start: 2019-11-05 | End: 2019-11-06 | Stop reason: HOSPADM

## 2019-11-04 RX ORDER — GABAPENTIN 300 MG/1
300 CAPSULE ORAL
Status: COMPLETED | OUTPATIENT
Start: 2019-11-04 | End: 2019-11-04

## 2019-11-04 RX ORDER — ONDANSETRON 2 MG/ML
4 INJECTION INTRAMUSCULAR; INTRAVENOUS DAILY PRN
Status: DISCONTINUED | OUTPATIENT
Start: 2019-11-04 | End: 2019-11-04 | Stop reason: HOSPADM

## 2019-11-04 RX ORDER — ONDANSETRON 2 MG/ML
4 INJECTION INTRAMUSCULAR; INTRAVENOUS EVERY 6 HOURS PRN
Status: DISCONTINUED | OUTPATIENT
Start: 2019-11-04 | End: 2019-11-06 | Stop reason: HOSPADM

## 2019-11-04 RX ORDER — HYDROMORPHONE HYDROCHLORIDE 1 MG/ML
0.2 INJECTION, SOLUTION INTRAMUSCULAR; INTRAVENOUS; SUBCUTANEOUS EVERY 5 MIN PRN
Status: CANCELLED | OUTPATIENT
Start: 2019-11-04

## 2019-11-04 RX ORDER — ACETAMINOPHEN 650 MG/20.3ML
975 LIQUID ORAL
Status: COMPLETED | OUTPATIENT
Start: 2019-11-04 | End: 2019-11-04

## 2019-11-04 RX ORDER — SODIUM CHLORIDE 9 MG/ML
INJECTION, SOLUTION INTRAVENOUS
Status: COMPLETED | OUTPATIENT
Start: 2019-11-04 | End: 2019-11-04

## 2019-11-04 RX ORDER — TRIPROLIDINE/PSEUDOEPHEDRINE 2.5MG-60MG
600 TABLET ORAL
Status: COMPLETED | OUTPATIENT
Start: 2019-11-04 | End: 2019-11-04

## 2019-11-04 RX ORDER — SODIUM CHLORIDE 0.9 % (FLUSH) 0.9 %
3 SYRINGE (ML) INJECTION
Status: DISCONTINUED | OUTPATIENT
Start: 2019-11-04 | End: 2019-11-06 | Stop reason: HOSPADM

## 2019-11-04 RX ORDER — FENTANYL CITRATE 50 UG/ML
INJECTION, SOLUTION INTRAMUSCULAR; INTRAVENOUS
Status: DISCONTINUED | OUTPATIENT
Start: 2019-11-04 | End: 2019-11-04

## 2019-11-04 RX ORDER — GABAPENTIN 300 MG/1
300 CAPSULE ORAL 3 TIMES DAILY
Status: DISCONTINUED | OUTPATIENT
Start: 2019-11-04 | End: 2019-11-04

## 2019-11-04 RX ORDER — METOPROLOL SUCCINATE 50 MG/1
50 TABLET, EXTENDED RELEASE ORAL DAILY
Status: DISCONTINUED | OUTPATIENT
Start: 2019-11-05 | End: 2019-11-06 | Stop reason: HOSPADM

## 2019-11-04 RX ORDER — OXYCODONE HYDROCHLORIDE 10 MG/1
10 TABLET ORAL EVERY 4 HOURS PRN
Status: DISCONTINUED | OUTPATIENT
Start: 2019-11-04 | End: 2019-11-06 | Stop reason: HOSPADM

## 2019-11-04 RX ORDER — GABAPENTIN 300 MG/1
300 CAPSULE ORAL 3 TIMES DAILY
Status: DISCONTINUED | OUTPATIENT
Start: 2019-11-04 | End: 2019-11-06 | Stop reason: HOSPADM

## 2019-11-04 RX ORDER — TRAMADOL HYDROCHLORIDE 50 MG/1
50 TABLET ORAL EVERY 6 HOURS PRN
Status: DISCONTINUED | OUTPATIENT
Start: 2019-11-04 | End: 2019-11-06 | Stop reason: HOSPADM

## 2019-11-04 RX ORDER — ALVIMOPAN 12 MG/1
12 CAPSULE ORAL ONCE
Status: COMPLETED | OUTPATIENT
Start: 2019-11-04 | End: 2019-11-04

## 2019-11-04 RX ORDER — ACETAMINOPHEN 10 MG/ML
1000 INJECTION, SOLUTION INTRAVENOUS EVERY 8 HOURS
Status: COMPLETED | OUTPATIENT
Start: 2019-11-04 | End: 2019-11-05

## 2019-11-04 RX ORDER — MUPIROCIN 20 MG/G
1 OINTMENT TOPICAL
Status: COMPLETED | OUTPATIENT
Start: 2019-11-04 | End: 2019-11-04

## 2019-11-04 RX ORDER — SODIUM CHLORIDE 9 MG/ML
INJECTION, SOLUTION INTRAVENOUS CONTINUOUS PRN
Status: DISCONTINUED | OUTPATIENT
Start: 2019-11-04 | End: 2019-11-04

## 2019-11-04 RX ORDER — LIDOCAINE HYDROCHLORIDE ANHYDROUS AND DEXTROSE MONOHYDRATE .8; 5 G/100ML; G/100ML
INJECTION, SOLUTION INTRAVENOUS CONTINUOUS PRN
Status: DISCONTINUED | OUTPATIENT
Start: 2019-11-04 | End: 2019-11-04

## 2019-11-04 RX ORDER — HEPARIN SODIUM 5000 [USP'U]/ML
5000 INJECTION, SOLUTION INTRAVENOUS; SUBCUTANEOUS EVERY 8 HOURS
Status: COMPLETED | OUTPATIENT
Start: 2019-11-04 | End: 2019-11-04

## 2019-11-04 RX ORDER — SODIUM CHLORIDE 0.9 % (FLUSH) 0.9 %
10 SYRINGE (ML) INJECTION
Status: DISCONTINUED | OUTPATIENT
Start: 2019-11-04 | End: 2019-11-06 | Stop reason: HOSPADM

## 2019-11-04 RX ORDER — ROCURONIUM BROMIDE 10 MG/ML
INJECTION, SOLUTION INTRAVENOUS
Status: DISCONTINUED | OUTPATIENT
Start: 2019-11-04 | End: 2019-11-04

## 2019-11-04 RX ORDER — LIDOCAINE HCL/PF 100 MG/5ML
SYRINGE (ML) INTRAVENOUS
Status: DISCONTINUED | OUTPATIENT
Start: 2019-11-04 | End: 2019-11-04

## 2019-11-04 RX ORDER — PROPOFOL 10 MG/ML
VIAL (ML) INTRAVENOUS
Status: DISCONTINUED | OUTPATIENT
Start: 2019-11-04 | End: 2019-11-04

## 2019-11-04 RX ADMIN — ROCURONIUM BROMIDE 20 MG: 10 INJECTION, SOLUTION INTRAVENOUS at 07:11

## 2019-11-04 RX ADMIN — SODIUM CHLORIDE, SODIUM GLUCONATE, SODIUM ACETATE, POTASSIUM CHLORIDE, MAGNESIUM CHLORIDE, SODIUM PHOSPHATE, DIBASIC, AND POTASSIUM PHOSPHATE: .53; .5; .37; .037; .03; .012; .00082 INJECTION, SOLUTION INTRAVENOUS at 08:11

## 2019-11-04 RX ADMIN — FENTANYL CITRATE 25 MCG: 50 INJECTION, SOLUTION INTRAMUSCULAR; INTRAVENOUS at 01:11

## 2019-11-04 RX ADMIN — IBUPROFEN 600 MG: 100 SUSPENSION ORAL at 06:11

## 2019-11-04 RX ADMIN — ALVIMOPAN 12 MG: 12 CAPSULE ORAL at 06:11

## 2019-11-04 RX ADMIN — ACETAMINOPHEN 976.6 MG: 650 SOLUTION ORAL at 06:11

## 2019-11-04 RX ADMIN — FENTANYL CITRATE 100 MCG: 50 INJECTION, SOLUTION INTRAMUSCULAR; INTRAVENOUS at 07:11

## 2019-11-04 RX ADMIN — LIDOCAINE HYDROCHLORIDE 0.02 MG/KG/MIN: 8 INJECTION, SOLUTION INTRAVENOUS at 07:11

## 2019-11-04 RX ADMIN — ROCURONIUM BROMIDE 20 MG: 10 INJECTION, SOLUTION INTRAVENOUS at 10:11

## 2019-11-04 RX ADMIN — ONDANSETRON 4 MG: 2 INJECTION INTRAMUSCULAR; INTRAVENOUS at 12:11

## 2019-11-04 RX ADMIN — Medication 10 MG: at 10:11

## 2019-11-04 RX ADMIN — Medication 10 MG: at 08:11

## 2019-11-04 RX ADMIN — SODIUM CHLORIDE: 0.9 INJECTION, SOLUTION INTRAVENOUS at 06:11

## 2019-11-04 RX ADMIN — FENTANYL CITRATE 25 MCG: 50 INJECTION, SOLUTION INTRAMUSCULAR; INTRAVENOUS at 03:11

## 2019-11-04 RX ADMIN — PROPOFOL 180 MG: 10 INJECTION, EMULSION INTRAVENOUS at 07:11

## 2019-11-04 RX ADMIN — EPHEDRINE SULFATE 5 MG: 50 INJECTION INTRAVENOUS at 08:11

## 2019-11-04 RX ADMIN — ROCURONIUM BROMIDE 20 MG: 10 INJECTION, SOLUTION INTRAVENOUS at 09:11

## 2019-11-04 RX ADMIN — ROCURONIUM BROMIDE 20 MG: 10 INJECTION, SOLUTION INTRAVENOUS at 08:11

## 2019-11-04 RX ADMIN — MIDAZOLAM HYDROCHLORIDE 2 MG: 1 INJECTION, SOLUTION INTRAMUSCULAR; INTRAVENOUS at 06:11

## 2019-11-04 RX ADMIN — LIDOCAINE HYDROCHLORIDE 80 MG: 20 INJECTION, SOLUTION INTRAVENOUS at 07:11

## 2019-11-04 RX ADMIN — SODIUM CHLORIDE: 0.9 INJECTION, SOLUTION INTRAVENOUS at 11:11

## 2019-11-04 RX ADMIN — MUPIROCIN 1 G: 20 OINTMENT TOPICAL at 09:11

## 2019-11-04 RX ADMIN — Medication 30 MG: at 07:11

## 2019-11-04 RX ADMIN — LIDOCAINE HYDROCHLORIDE 0.2 MG: 10 INJECTION, SOLUTION EPIDURAL; INFILTRATION; INTRACAUDAL; PERINEURAL at 06:11

## 2019-11-04 RX ADMIN — IBUPROFEN 800 MG: 800 INJECTION INTRAVENOUS at 09:11

## 2019-11-04 RX ADMIN — ALVIMOPAN 12 MG: 12 CAPSULE ORAL at 09:11

## 2019-11-04 RX ADMIN — PROMETHAZINE HYDROCHLORIDE 6.25 MG: 25 INJECTION INTRAMUSCULAR; INTRAVENOUS at 12:11

## 2019-11-04 RX ADMIN — CEFTRIAXONE SODIUM 2 G: 2 INJECTION, SOLUTION INTRAVENOUS at 07:11

## 2019-11-04 RX ADMIN — GABAPENTIN 300 MG: 300 CAPSULE ORAL at 03:11

## 2019-11-04 RX ADMIN — GABAPENTIN 300 MG: 300 CAPSULE ORAL at 06:11

## 2019-11-04 RX ADMIN — ROCURONIUM BROMIDE 50 MG: 10 INJECTION, SOLUTION INTRAVENOUS at 07:11

## 2019-11-04 RX ADMIN — Medication 10 MG: at 09:11

## 2019-11-04 RX ADMIN — ROCURONIUM BROMIDE 10 MG: 10 INJECTION, SOLUTION INTRAVENOUS at 10:11

## 2019-11-04 RX ADMIN — SODIUM CHLORIDE, SODIUM GLUCONATE, SODIUM ACETATE, POTASSIUM CHLORIDE, MAGNESIUM CHLORIDE, SODIUM PHOSPHATE, DIBASIC, AND POTASSIUM PHOSPHATE: .53; .5; .37; .037; .03; .012; .00082 INJECTION, SOLUTION INTRAVENOUS at 07:11

## 2019-11-04 RX ADMIN — HEPARIN SODIUM 5000 UNITS: 5000 INJECTION, SOLUTION INTRAVENOUS; SUBCUTANEOUS at 06:11

## 2019-11-04 RX ADMIN — MUPIROCIN 1 G: 20 OINTMENT TOPICAL at 06:11

## 2019-11-04 RX ADMIN — METRONIDAZOLE 500 MG: 500 SOLUTION INTRAVENOUS at 07:11

## 2019-11-04 RX ADMIN — GABAPENTIN 300 MG: 300 CAPSULE ORAL at 09:11

## 2019-11-04 RX ADMIN — DEXAMETHASONE SODIUM PHOSPHATE 8 MG: 4 INJECTION, SOLUTION INTRAMUSCULAR; INTRAVENOUS at 07:11

## 2019-11-04 RX ADMIN — SUGAMMADEX 200 MG: 100 INJECTION, SOLUTION INTRAVENOUS at 11:11

## 2019-11-04 RX ADMIN — ACETAMINOPHEN 1000 MG: 10 INJECTION, SOLUTION INTRAVENOUS at 10:11

## 2019-11-04 RX ADMIN — ONDANSETRON 4 MG: 2 INJECTION INTRAMUSCULAR; INTRAVENOUS at 11:11

## 2019-11-04 RX ADMIN — IBUPROFEN 800 MG: 800 INJECTION INTRAVENOUS at 01:11

## 2019-11-04 RX ADMIN — ACETAMINOPHEN 1000 MG: 10 INJECTION, SOLUTION INTRAVENOUS at 07:11

## 2019-11-04 NOTE — BRIEF OP NOTE
[facility]  Brief Operative Note    SUMMARY     Surgery Date: 11/4/2019     Surgeon(s) and Role:     * Loi Somers MD - Primary     * John Martinez MD - Resident - Assisting        Pre-op Diagnosis:  Diverticulitis of large intestine without perforation or abscess without bleeding [K57.32]    Post-op Diagnosis:  Post-Op Diagnosis Codes:     * Diverticulitis of large intestine without perforation or abscess without bleeding [K57.32]    Procedure(s) (LRB):  1. COLECTOMY, SIGMOID, LAPAROSCOPIC  2. Endoscopic evaluation of the anastomosis  3. Laparoscopic splenic flexure mobilization.    Anesthesia: General    Description of the findings of the procedure:   1. Segment of sigmoid colon with thickening.    Estimated Blood Loss: 150 mL         Specimens:   Specimen (12h ago, onward)     Start     Ordered    11/04/19 1036  Specimen to Pathology - Surgery  Once     Comments:  Pre-op Diagnosis: Diverticulitis of large intestine without perforation or abscess without bleeding [K57.32]Procedure(s):COLECTOMY, SIGMOID, LAPAROSCOPIC, flex sig, ERAS low Number of specimens: 3Name of specimens: 1. SIGMOID COLON - PERM2. DISTAL ANASTIMOSIS DONUT - PERM 3. PROXIMAL ANASTIMOSIS DONUT - PERM      11/04/19 1040

## 2019-11-04 NOTE — ANESTHESIA PREPROCEDURE EVALUATION
11/04/2019  You Seals is a 43 y.o., male presenting for colonoscopy.    Past Medical History:   Diagnosis Date    Abdominal hernia     Hepatitis C 2018    Hiatal hernia 2018    High cholesterol 2018    History of kidney stones     Hypertension 2018     Past Surgical History:   Procedure Laterality Date    COLONOSCOPY N/A 8/20/2019    Procedure: COLONOSCOPY;  Surgeon: Marce Womack MD;  Location: [facility];  Service: Endoscopy;  Laterality: N/A;  Can not arrive before 8 am    EXTRACORPOREAL SHOCK WAVE LITHOTRIPSY      GASTROSCOPY      Inginal Hernia  1978    possible appendix removal at this time??     Review of patient's allergies indicates:   Allergen Reactions    Sulfa (sulfonamide antibiotics) Hives     Current Facility-Administered Medications on File Prior to Visit   Medication Dose Route Frequency Provider Last Rate Last Dose    0.9%  NaCl infusion   Intravenous On Call Procedure Rita Costa NP        acetaminophen oral solution 976.601 mg  976.601 mg Oral On Call Procedure Rita Costa NP        alvimopan capsule 12 mg  12 mg Oral Once Rita Costa NP        cefTRIAXone (ROCEPHIN) 2 g in dextrose 5 % 50 mL IVPB  2 g Intravenous On Call Procedure Rita Costa NP        And    metronidazole IVPB 500 mg  500 mg Intravenous On Call Procedure Rita Costa NP        gabapentin capsule 300 mg  300 mg Oral On Call Procedure Rita Costa NP        heparin (porcine) injection 5,000 Units  5,000 Units Subcutaneous Q8H Rita Costa NP        ibuprofen 100 mg/5 mL suspension 600 mg  600 mg Oral On Call Procedure Rita Costa NP        lidocaine (PF) 10 mg/ml (1%) injection 10 mg  1 mL Intradermal On Call Procedure Rita Costa NP        mupirocin 2 % ointment 1 g  1 g Nasal On Call Procedure Rita Costa  NP         Current Outpatient Medications on File Prior to Visit   Medication Sig Dispense Refill    dicyclomine (BENTYL) 20 mg tablet Take 1 tablet (20 mg total) by mouth every 6 (six) hours. 120 tablet 0    ergocalciferol (ERGOCALCIFEROL) 50,000 unit Cap TAKE ONE CAPSULE EVERY WEEK FOR LOW VITAMIN D  5    Lactobacillus rhamnosus GG (CULTURELLE) 10 billion cell capsule Take 1 capsule by mouth once daily.      metoprolol succinate (TOPROL-XL) 50 MG 24 hr tablet Take 50 mg by mouth once daily.  11    metroNIDAZOLE (FLAGYL) 500 MG tablet On the day before your surgery, take 1 tablet (500mg) at 1pm, 2pm and 11pm. 3 tablet 0    neomycin (MYCIFRADIN) 500 mg Tab On the day before your surgery, take 2 tablets (1,000mg) by mouth at 1pm, 2pm and 11pm. 6 tablet 0    omeprazole (PRILOSEC) 20 MG capsule Take 20 mg by mouth once daily.  11         Pre-op Assessment    I have reviewed the Patient Summary Reports.     I have reviewed the Nursing Notes.   I have reviewed the Medications.     Review of Systems  Anesthesia Hx:  No problems with previous Anesthesia   Denies Personal Hx of Anesthesia complications.   Hematology/Oncology:  Hematology Normal   Oncology Normal     EENT/Dental:EENT/Dental Normal   Cardiovascular:   Exercise tolerance: good Hypertension ECG has been reviewed.    Pulmonary:  Pulmonary Normal    Renal/:  Renal/ Normal     Hepatic/GI:   Hiatal Hernia, Hepatitis, C    Musculoskeletal:  Musculoskeletal Normal    Endocrine:  Endocrine Normal    Dermatological:  Skin Normal    Psych:  Psychiatric Normal           Physical Exam  General:  Obesity    Airway/Jaw/Neck:  Airway Findings: Mouth Opening: Normal Tongue: Normal  General Airway Assessment: Adult  Mallampati: III  Improves to II with phonation.  TM Distance: Normal, at least 6 cm  Jaw/Neck Findings:  Neck ROM: Normal ROM      Dental:  Dental Findings: In tact        Mental Status:  Mental Status Findings:  Cooperative, Alert and Oriented          Anesthesia Plan  Type of Anesthesia, risks & benefits discussed:  Anesthesia Type:  general  Patient's Preference:   Intra-op Monitoring Plan: standard ASA monitors  Intra-op Monitoring Plan Comments:   Post Op Pain Control Plan: per primary service following discharge from PACU  Post Op Pain Control Plan Comments:   Induction:   IV  Beta Blocker:  Patient is on a Beta-Blocker and has received one dose within the past 24 hours (No further documentation required).       Informed Consent: Patient understands risks and agrees with Anesthesia plan.  Questions answered. Anesthesia consent signed with patient.  ASA Score: 2     Day of Surgery Review of History & Physical:    H&P update referred to the surgeon.     Anesthesia Plan Notes: Low ERAS protocol        Ready For Surgery From Anesthesia Perspective.     Lab Results   Component Value Date    WBC 4.79 10/28/2019    HGB 13.8 (L) 10/28/2019    HCT 40.6 10/28/2019    MCV 91 10/28/2019     10/28/2019       BMP  Lab Results   Component Value Date     10/28/2019     10/28/2019    K 4.3 10/28/2019    K 4.3 10/28/2019     10/28/2019     10/28/2019    CO2 30 (H) 10/28/2019    CO2 30 (H) 10/28/2019    BUN 12 10/28/2019    BUN 12 10/28/2019    CREATININE 1.0 10/28/2019    CREATININE 1.0 10/28/2019    CALCIUM 9.7 10/28/2019    CALCIUM 9.7 10/28/2019    ANIONGAP 7 (L) 10/28/2019    ANIONGAP 7 (L) 10/28/2019    ESTGFRAFRICA >60.0 10/28/2019    ESTGFRAFRICA >60.0 10/28/2019    EGFRNONAA >60.0 10/28/2019    EGFRNONAA >60.0 10/28/2019

## 2019-11-04 NOTE — NURSING TRANSFER
Nursing Transfer Note      11/4/2019     Transfer To: 1041    Transfer via bed    Transfer with IV fluids     Transported by RN and transport     Medicines sent: IV fluids    Chart send with patient: Yes    Notified: spouse    Patient reassessed at: 11/4/19 6416     Upon arrival to floor: patient oriented to room, call bell in reach and bed in lowest position

## 2019-11-04 NOTE — ANESTHESIA PROCEDURE NOTES
Intubation  Performed by: Denise Matos CRNA  Authorized by: Marko Larsen MD     Intubation:     Induction:  Intravenous    Intubated:  Postinduction    Mask Ventilation:  Easy mask    Attempts:  1    Attempted By:  CRNA    Method of Intubation:  Direct    Blade:  Isaac 2    Laryngeal View Grade: Grade I - full view of chords      Difficult Airway Encountered?: No      Complications:  None    Airway Device:  Oral endotracheal tube    Airway Device Size:  7.5    Style/Cuff Inflation:  Cuffed    Inflation Amount (mL):  10    Tube secured:  22    Secured at:  The lips    Placement Verified By:  Capnometry    Complicating Factors:  None    Findings Post-Intubation:  BS equal bilateral and atraumatic/condition of teeth unchanged

## 2019-11-04 NOTE — OP NOTE
YOU MAE  [id number]  [id number]    OPERATIVE NOTE:    DATE OF OPERATION: 11/04/2019    PREOPERATIVE DIAGNOSIS:  Diverticulitis    POSTOPERATIVE DIAGNOSIS:  Diverticulitis    PROCEDURE PERFORMED:   1. Laparoscopic sigmoid colectomy  2. Laparoscopic mobilization of the splenic flexure  3. Flexible sigmoidoscopy.      ATTENDING SURGEON: TOSHIA Somers MD    ASSISTING SURGEON: Juan    ANESTHESIA: General    ESTIMATED BLOOD LOSS:  150 mL    FINDINGS:  1.  Mild diverticulitis in the proximal sigmoid colon.  Negative leak on intraoperative leak testing.  Left ureter seen and protected.    SPECIMENS:   1. Sigmoid colon.  2. Proximal anastomotic donut  3. Distal anastomotic donut.     COMPLICATIONS:  None    DRAINS:  None    DISPOSITION: PACU    CONDITION: good    INDICATION FOR PROCEDURE:  You Mae is a 43 y.o. male with recurrent diverticulitis interfering with his quality of life.  Resection was therefore recommended.        DESCRIPTION OF PROCEDURE:    After informed consent was reviewed, the patient was taken to the Operating Room and placed under general anesthesia.  A Nye  catheter was sterilely inserted.  Preoperative antibiotics with ceftriaxone and Flagyl were given.  The patient was placed in lithotomy position. The arms were tucked and the anterior abdominal wall was prepped and draped in the usual sterile fashion.  A time out was perfomed.                 The abdomen was entered through a 8 cm Pfannenstiel incision 2 fingerbreadths above the pubic symphysis.  The skin was incised sharply.  The fascia was incised with electrocautery.  Fascial flaps were elevated up to the umbilicus and down to the pubic symphysis.  The midline of the rectus muscles was then divided.  The abdomen was entered sharply without injury to the underlying bowel.  The sleeve of the GelPort was then placed. Three 5mm trocars were inserted in the right lower quadrant, just above the umbilicus, and the left lower quadrant.    The top of the GelPort was then placed and the abdomen was insufflated.    The abdomen was inspected.  There was a large amount of intra-abdominal adipose tissue from the omentum.  The omentum was placed over the transverse colon.  The inferior mesenteric artery pedicle was elevated off the sacral promontory.  The peritoneum on the right side of sigmoid colon mesentery was incised.  The retrorectal space was entered.  Dissection continued over laterally into the left ureter was identified. The hypogastric nerves were identified. A window was made around the inferior mesenteric artery at the base of the aorta.  Since his disease was benign, I elected to make a window between the left colic artery in the superior rectal artery to preserve the left colic artery.  The proximal sigmoid colon had diverticulitis.  The descending colon appeared healthy.  The distal descending colon, a anna was made with a marker to identify the site of transection after mobilization.  The left ureter was not fully visualized after mobilizing the JACQUELYN pedicle.  Therefore, a lateral medial approach was then performed.  The lateral attachments were taken down with the ligature.  The retroperitoneum was identified and swept posteriorly.  Dissection continued up around the hepatic flexure.  The splenic flexure was fully mobilized.  The lesser sac was entered.  The omentum was taken off the transverse colon with the ligature.  Attachments to the inferior border the pancreas were divided.  With this, the splenic flexure was mobilized and allowed adequate redundancy into the pelvis.  The inferior mesenteric vein was not divided.  With this, the left ureter could be easily seen.  High ligation of the superior rectal artery was then performed using the ligature.  The left colic artery was preserved.  Dissection continued up the mesentery towards the junction of the descending and sigmoid colon where the colon had been previously marked..  The cap of  the GelPort was then removed.  The specimen was brought out.  At the junction of the descending and sigmoid colon, a window was made in the mesentery. The marginal artery at this level was tested and found pulsatile.  The colon was divided with a Furness clamp after a 2 0 nylon pursestring had been placed. The colon was divided and opened and appeared healthy.  A 29 EEA anvil was inserted and the pursestring was tied down and wrapped around the 2nd time for reinforcement. Prior to wrapping the second time, two diverticulum near the anvil were dissected free and included in the pursestring. The descending colon was then packed out of the abdomen. Dissection continued down towards the top of the rectum. The top of the rectum was identified by splaying of the taenea coli, absence of epiploica, and passage of rectal sizers.  The rectum was circumferentially dissected and the intervening mesentery was divided with the ligasure.  The top of the rectum was divided with a Countour stapler with a green load.             The EEA stapler was placed into the anal canal and advanced to the top of the rectal stump.  The spike was deployed just posterior to the staple line.  The anvil was connected and the stapler closed.  Care was taken to avoid entrapment of the bladder or ureters.  The stapler was fired and removed.  On the back table, two circumferential anastmotic donuts were found.  Leak testing was done with a colonoscope.  The anastomosis appeared healthy.  Leak test was negative. The anastomosis was then oversewn with 3-0 vicryl sutures.  The abdomen was reinsufflated.  The descending colon set straight in abdomen without any twisting.  The small bowel was placed on top of the descending colon and the omentum was pulled down into the pelvis.              All members of the team then changed gowns and gloves and new instruments were used for a clean closure.   40mL of dilute Exparel was injected bilaterally as TAP blocks.    The Pfannenstiel incision was closed in layers.  The posterior peritoneum and fascia were closed with a #1 Running PDS.  Simple #1 PDS sutures were used to reapproximate the muscle.  The anterior rectus fascia was closed with #1 Running PDS after hemostasis had been ensured.    All incisions were irrigated with saline and hemostasis was noted.  Skin incisions were closed with 4-0 Vicryl in subcuticular fashion and steri-strips were applied.                The patient tolerated the procedure well.  There were no apparent intraoperative complications.  All sponge, needle, and instrument counts were correct x2.  The patient  was extubated and taken to PACU in stable condition.                TOSHIA Somers MD, FACS  Staff Surgeon  Colon & Rectal Surgery

## 2019-11-04 NOTE — PLAN OF CARE
POC reviewed with pt. Kevin4. VSS on room air. Lap sites and transverse abdominal incision with dermabond and steri strips C/D/I. Tolerating clears. Denies nausea/emesis. Up to chair with minimal assistance. Nye cath in place. Call light used appropriately. Significant other at bedside.

## 2019-11-04 NOTE — INTERVAL H&P NOTE
The patient has been examined and the H&P has been reviewed:    I concur with the findings and no changes have occurred since H&P was written.    Anesthesia/Surgery risks, benefits and alternative options discussed and understood by patient/family.          Active Hospital Problems    Diagnosis  POA    Diverticulitis [K57.92]  Yes      Resolved Hospital Problems   No resolved problems to display.

## 2019-11-04 NOTE — NURSING TRANSFER
Nursing Transfer Note      11/4/2019     Transfer To: 1041    Transfer via bed    Transfer with IVF infusing    Transported by Shannen CUEVAS    Medicines sent: None    Chart send with patient: Yes    Notified: spouse    Patient reassessed at: 11/4/19 1530    Upon arrival to floor: patient oriented to room, call bell in reach and bed in lowest position

## 2019-11-04 NOTE — ANESTHESIA POSTPROCEDURE EVALUATION
Anesthesia Post Evaluation    Patient: You Seals    Procedure(s) Performed: Procedure(s) (LRB):  COLECTOMY, SIGMOID, LAPAROSCOPIC, flex sig, ERAS low (N/A)    Final Anesthesia Type: general  Patient location during evaluation: PACU  Patient participation: Yes- Able to Participate  Level of consciousness: awake and alert and awake  Post-procedure vital signs: reviewed and stable  Pain management: adequate  Airway patency: patent  PONV status at discharge: No PONV  Anesthetic complications: no      Cardiovascular status: blood pressure returned to baseline  Respiratory status: unassisted and spontaneous ventilation  Hydration status: euvolemic  Follow-up not needed.          Vitals Value Taken Time   /87 11/4/2019  2:32 PM   Temp 36.7 °C (98.1 °F) 11/4/2019 12:30 PM   Pulse 95 11/4/2019  2:32 PM   Resp 18 11/4/2019  2:32 PM   SpO2 97 % 11/4/2019  2:32 PM   Vitals shown include unvalidated device data.      No case tracking events are documented in the log.      Pain/Benita Score: Pain Rating Prior to Med Admin: 9 (11/4/2019  1:09 PM)  Benita Score: 10 (11/4/2019  1:30 PM)

## 2019-11-04 NOTE — PROGRESS NOTES
"Patient stated" my stomach feels sore". This writer offered patient PRN pain medication patient stated" I don't want anything for pain". Will continue to monitor.  "

## 2019-11-04 NOTE — TRANSFER OF CARE
"Anesthesia Transfer of Care Note    Patient: You Seals    Procedure(s) Performed: Procedure(s) (LRB):  COLECTOMY, SIGMOID, LAPAROSCOPIC, flex sig, ERAS low (N/A)    Patient location: PACU    Anesthesia Type: general    Transport from OR: Transported from OR on 6-10 L/min O2 by face mask with adequate spontaneous ventilation    Post pain: adequate analgesia    Post assessment: tolerated procedure well and no apparent anesthetic complications    Post vital signs: stable    Level of consciousness: lethargic and responds to stimulation    Nausea/Vomiting: no nausea/vomiting    Complications: none    Transfer of care protocol was followed      Last vitals:   Visit Vitals  /75 (BP Location: Left arm, Patient Position: Lying)   Pulse 87   Temp 37.1 °C (98.8 °F) (Axillary)   Resp 14   Ht 5' 11" (1.803 m)   Wt 111.2 kg (245 lb 2.4 oz)   SpO2 95%   BMI 34.19 kg/m²     "

## 2019-11-05 ENCOUNTER — TELEPHONE (OUTPATIENT)
Dept: ENDOSCOPY | Facility: HOSPITAL | Age: 44
End: 2019-11-05

## 2019-11-05 LAB
ANION GAP SERPL CALC-SCNC: 7 MMOL/L (ref 8–16)
BASOPHILS # BLD AUTO: 0.01 K/UL (ref 0–0.2)
BASOPHILS NFR BLD: 0.1 % (ref 0–1.9)
BUN SERPL-MCNC: 12 MG/DL (ref 6–20)
CALCIUM SERPL-MCNC: 8.6 MG/DL (ref 8.7–10.5)
CHLORIDE SERPL-SCNC: 108 MMOL/L (ref 95–110)
CO2 SERPL-SCNC: 22 MMOL/L (ref 23–29)
CREAT SERPL-MCNC: 0.9 MG/DL (ref 0.5–1.4)
DIFFERENTIAL METHOD: ABNORMAL
EOSINOPHIL # BLD AUTO: 0 K/UL (ref 0–0.5)
EOSINOPHIL NFR BLD: 0 % (ref 0–8)
ERYTHROCYTE [DISTWIDTH] IN BLOOD BY AUTOMATED COUNT: 13.7 % (ref 11.5–14.5)
EST. GFR  (AFRICAN AMERICAN): >60 ML/MIN/1.73 M^2
EST. GFR  (NON AFRICAN AMERICAN): >60 ML/MIN/1.73 M^2
GLUCOSE SERPL-MCNC: 111 MG/DL (ref 70–110)
HCT VFR BLD AUTO: 38 % (ref 40–54)
HGB BLD-MCNC: 12.6 G/DL (ref 14–18)
IMM GRANULOCYTES # BLD AUTO: 0.03 K/UL (ref 0–0.04)
IMM GRANULOCYTES NFR BLD AUTO: 0.3 % (ref 0–0.5)
LYMPHOCYTES # BLD AUTO: 1.6 K/UL (ref 1–4.8)
LYMPHOCYTES NFR BLD: 17.8 % (ref 18–48)
MAGNESIUM SERPL-MCNC: 1.9 MG/DL (ref 1.6–2.6)
MCH RBC QN AUTO: 30.9 PG (ref 27–31)
MCHC RBC AUTO-ENTMCNC: 33.2 G/DL (ref 32–36)
MCV RBC AUTO: 93 FL (ref 82–98)
MONOCYTES # BLD AUTO: 0.8 K/UL (ref 0.3–1)
MONOCYTES NFR BLD: 8.8 % (ref 4–15)
NEUTROPHILS # BLD AUTO: 6.6 K/UL (ref 1.8–7.7)
NEUTROPHILS NFR BLD: 73 % (ref 38–73)
NRBC BLD-RTO: 0 /100 WBC
PHOSPHATE SERPL-MCNC: 2.9 MG/DL (ref 2.7–4.5)
PLATELET # BLD AUTO: 237 K/UL (ref 150–350)
PMV BLD AUTO: 9.7 FL (ref 9.2–12.9)
POTASSIUM SERPL-SCNC: 4.1 MMOL/L (ref 3.5–5.1)
RBC # BLD AUTO: 4.08 M/UL (ref 4.6–6.2)
SODIUM SERPL-SCNC: 137 MMOL/L (ref 136–145)
WBC # BLD AUTO: 9.07 K/UL (ref 3.9–12.7)

## 2019-11-05 PROCEDURE — 25000003 PHARM REV CODE 250: Performed by: STUDENT IN AN ORGANIZED HEALTH CARE EDUCATION/TRAINING PROGRAM

## 2019-11-05 PROCEDURE — 36415 COLL VENOUS BLD VENIPUNCTURE: CPT

## 2019-11-05 PROCEDURE — 83735 ASSAY OF MAGNESIUM: CPT

## 2019-11-05 PROCEDURE — 80048 BASIC METABOLIC PNL TOTAL CA: CPT

## 2019-11-05 PROCEDURE — 85025 COMPLETE CBC W/AUTO DIFF WBC: CPT

## 2019-11-05 PROCEDURE — 63600175 PHARM REV CODE 636 W HCPCS: Performed by: STUDENT IN AN ORGANIZED HEALTH CARE EDUCATION/TRAINING PROGRAM

## 2019-11-05 PROCEDURE — 94761 N-INVAS EAR/PLS OXIMETRY MLT: CPT

## 2019-11-05 PROCEDURE — 84100 ASSAY OF PHOSPHORUS: CPT

## 2019-11-05 PROCEDURE — 20600001 HC STEP DOWN PRIVATE ROOM

## 2019-11-05 RX ADMIN — IBUPROFEN 800 MG: 800 INJECTION INTRAVENOUS at 06:11

## 2019-11-05 RX ADMIN — ENOXAPARIN SODIUM 40 MG: 100 INJECTION SUBCUTANEOUS at 04:11

## 2019-11-05 RX ADMIN — ACETAMINOPHEN 1000 MG: 10 INJECTION, SOLUTION INTRAVENOUS at 02:11

## 2019-11-05 RX ADMIN — MUPIROCIN 1 G: 20 OINTMENT TOPICAL at 09:11

## 2019-11-05 RX ADMIN — IBUPROFEN 800 MG: 400 TABLET, FILM COATED ORAL at 02:11

## 2019-11-05 RX ADMIN — GABAPENTIN 300 MG: 300 CAPSULE ORAL at 02:11

## 2019-11-05 RX ADMIN — MUPIROCIN 1 G: 20 OINTMENT TOPICAL at 08:11

## 2019-11-05 RX ADMIN — IBUPROFEN 800 MG: 400 TABLET, FILM COATED ORAL at 09:11

## 2019-11-05 RX ADMIN — ALVIMOPAN 12 MG: 12 CAPSULE ORAL at 08:11

## 2019-11-05 RX ADMIN — TRAMADOL HYDROCHLORIDE 50 MG: 50 TABLET, FILM COATED ORAL at 08:11

## 2019-11-05 RX ADMIN — PANTOPRAZOLE SODIUM 40 MG: 40 TABLET, DELAYED RELEASE ORAL at 08:11

## 2019-11-05 RX ADMIN — ACETAMINOPHEN 1000 MG: 500 TABLET ORAL at 09:11

## 2019-11-05 RX ADMIN — ACETAMINOPHEN 1000 MG: 10 INJECTION, SOLUTION INTRAVENOUS at 05:11

## 2019-11-05 RX ADMIN — METOPROLOL SUCCINATE 50 MG: 50 TABLET, EXTENDED RELEASE ORAL at 08:11

## 2019-11-05 RX ADMIN — GABAPENTIN 300 MG: 300 CAPSULE ORAL at 08:11

## 2019-11-05 RX ADMIN — GABAPENTIN 300 MG: 300 CAPSULE ORAL at 09:11

## 2019-11-05 NOTE — TELEPHONE ENCOUNTER
Dr. Montanez,    Patient and significant other Linda are inquiring if his Esophageal Manometry with Impedance measurement should be postponed since he just had surgery with Dr. Somers yesterday and will be hospitalized for a few days. He is scheduled for this upcoming Tuesday 12th.  Please advise.

## 2019-11-05 NOTE — ASSESSMENT & PLAN NOTE
OR 11/ 4 lap sigmoid resection for diverticulitis    -await return of bowel function, reg diet  -continue multi modality for pain control  -encourage ambulation and use of IS  -hai hose and SCD  -prophalactic lovenox and PPI  -d/c armenta, voiding

## 2019-11-05 NOTE — PLAN OF CARE
POC reviewed with pt. AAOx4. VSS on room air. Lap sites and transverse abdominal incision with dermabond and steri strips C/D/I. Tolerating regular diet. Denies nausea/emesis. Up ad antonia, walking frequently. Pain controlled. x3 bowel movements. Voiding adequately post armenta cath removal. Call light used appropriately.     Pt anticipating getting discharged tomorrow

## 2019-11-05 NOTE — PROGRESS NOTES
[facility]  Colorectal Surgery  Progress Note    Patient Name: You Seals  MRN: [id number]  Admission Date: 11/4/2019  Hospital Length of Stay: 1 days  Attending Physician: Loi Somers MD    Subjective:     Interval History: no acute evetns overnite, no n/v, adequate pain control    Post-Op Info:  Procedure(s) (LRB):  COLECTOMY, SIGMOID, LAPAROSCOPIC, flex sig, ERAS low (N/A)   1 Day Post-Op      Medications:  Continuous Infusions:  Scheduled Meds:   acetaminophen  1,000 mg Intravenous Q8H    Followed by    acetaminophen  1,000 mg Oral Q8H    alvimopan  12 mg Oral BID    enoxaparin  40 mg Subcutaneous Daily    gabapentin  300 mg Oral TID    ibuprofen  800 mg Oral Q8H    metoprolol succinate  50 mg Oral Daily    mupirocin  1 g Nasal BID    pantoprazole  40 mg Oral Daily     PRN Meds:   ondansetron    oxyCODONE    oxyCODONE    sodium chloride 0.9%    sodium chloride 0.9%    traMADol        Objective:     Vital Signs (Most Recent):  Temp: 97.6 °F (36.4 °C) (11/05/19 0842)  Pulse: 94 (11/05/19 1035)  Resp: 20 (11/05/19 0842)  BP: 124/74 (11/05/19 1035)  SpO2: 99 % (11/05/19 0842) Vital Signs (24h Range):  Temp:  [97.6 °F (36.4 °C)-99.3 °F (37.4 °C)] 97.6 °F (36.4 °C)  Pulse:  [] 94  Resp:  [14-20] 20  SpO2:  [93 %-99 %] 99 %  BP: (114-172)/(74-95) 124/74     Intake/Output - Last 3 Shifts       11/03 0700 - 11/04 0659 11/04 0700 - 11/05 0659 11/05 0700 - 11/06 0659    P.O.  630 800    I.V. (mL/kg)  2036.7 (18.3)     Total Intake(mL/kg)  2666.7 (24) 800 (7.2)    Urine (mL/kg/hr)  3085 (1.2) 180 (0.4)    Stool   0    Blood  150     Total Output  3235 180    Net  -568.3 +620           Stool Occurrence   1 x          Physical Exam   Constitutional: He is oriented to person, place, and time. He appears well-developed and well-nourished. No distress.   Cardiovascular: Normal rate, regular rhythm and normal heart sounds.   Pulmonary/Chest: Effort normal and breath sounds  normal. No respiratory distress. He has no wheezes. He has no rales.   Abdominal: Soft. He exhibits no distension and no mass. There is no tenderness. There is no guarding.   abd inc line healing well  Pos for bm with flatus   Musculoskeletal: Normal range of motion.   Neurological: He is alert and oriented to person, place, and time.   Skin: Skin is warm and dry.   Psychiatric: He has a normal mood and affect. His behavior is normal. Judgment and thought content normal.   Nursing note and vitals reviewed.        Significant Labs:  BMP (Last 3 Results):   Recent Labs   Lab 11/05/19  0329   *      K 4.1      CO2 22*   BUN 12   CREATININE 0.9   CALCIUM 8.6*   MG 1.9     CBC (Last 3 Results):   Recent Labs   Lab 11/05/19 0329   WBC 9.07   RBC 4.08*   HGB 12.6*   HCT 38.0*      MCV 93   MCH 30.9   MCHC 33.2       Significant Diagnostics:  None    Assessment/Plan:     * Diverticulitis  OR 11/ 4 lap sigmoid resection for diverticulitis    -await return of bowel function, reg diet  -continue multi modality for pain control  -encourage ambulation and use of IS  -hai hose and SCD  -prophalactic lovenox and PPI  -d/c armenta, voiding    Chronic hepatitis C  Monitor labs          Roseline Mohamud NP  Colorectal Surgery  [facility]

## 2019-11-05 NOTE — PLAN OF CARE
CM met with patient to complete discharge assessment nad planning. Patient states he is independent with all activities of daily living. Patient has good family support and transportation home. Patient plans to discharge to home when medically stable for hospital discharge. CM and Sw will continue to follow for discharge needs.    PCP: Marisa French MD    Pharmacy:   [facility]/pharmacy #[id number] - [place], [place] - [address] AT [address] OF [facility]  [address]  [facility] [address]  Phone: [phone] Fax: [phone]    [id number] - [place], [place] - [address]  [address]  [place] [address]  Phone: [phone] Fax: [phone]    Payor: MEDICAID / Plan: [facility] COMMUNITY PLAN [facility] ([place] MEDICAID) / Product Type: Managed Medicaid /      11/05/19 1539   Discharge Assessment   Assessment Type Discharge Planning Assessment   Confirmed/corrected address and phone number on facesheet? Yes   Assessment information obtained from? Patient   Communicated expected length of stay with patient/caregiver yes   Prior to hospitilization cognitive status: Alert/Oriented   Prior to hospitalization functional status: Independent   Current cognitive status: Alert/Oriented   Current Functional Status: Independent   Lives With significant other   Able to Return to Prior Arrangements yes   Is patient able to care for self after discharge? Yes   Patient's perception of discharge disposition home or selfcare   Readmission Within the Last 30 Days no previous admission in last 30 days   Patient currently being followed by outpatient case management? No   Patient currently receives any other outside agency services? No   Equipment Currently Used at Home none   Do you have any problems affording any of your prescribed medications? TBD   Is the patient taking medications as prescribed? yes   Does the patient have transportation home? Yes   Transportation Anticipated family or friend will provide   Dialysis Name and  Scheduled days n/a   Does the patient receive services at the [facility]? No   Discharge Plan A Home;Home with family;Home Health   Discharge Plan B Home;Home with family   DME Needed Upon Discharge  none   Patient/Family in Agreement with Plan yes

## 2019-11-05 NOTE — PLAN OF CARE
Plan of care reviewed with patient.   Alert and oriented x4.  VSS on RA.  Pain managed with PRN medications.  Patient had armenta all night- removed this am at 5:10.  Tolerating clear liquids intake with no complains of nausea.  Patient slept between care.  Frequent rounds for safety.  Call light within reach.  Will continue to monitor. Lap sites CDI.        Problem: Adult Inpatient Plan of Care  Goal: Plan of Care Review  Outcome: Ongoing, Progressing  Goal: Patient-Specific Goal (Individualization)  Outcome: Ongoing, Progressing  Goal: Absence of Hospital-Acquired Illness or Injury  Outcome: Ongoing, Progressing  Goal: Optimal Comfort and Wellbeing  Outcome: Ongoing, Progressing  Goal: Readiness for Transition of Care  Outcome: Ongoing, Progressing  Goal: Rounds/Family Conference  Outcome: Ongoing, Progressing     Problem: Fall Injury Risk  Goal: Absence of Fall and Fall-Related Injury  Outcome: Ongoing, Progressing     Problem: Infection  Goal: Infection Symptom Resolution  Outcome: Ongoing, Progressing

## 2019-11-05 NOTE — SUBJECTIVE & OBJECTIVE
Subjective:     Interval History: no acute evetns overnite, no n/v, adequate pain control    Post-Op Info:  Procedure(s) (LRB):  COLECTOMY, SIGMOID, LAPAROSCOPIC, flex sig, ERAS low (N/A)   1 Day Post-Op      Medications:  Continuous Infusions:  Scheduled Meds:   acetaminophen  1,000 mg Intravenous Q8H    Followed by    acetaminophen  1,000 mg Oral Q8H    alvimopan  12 mg Oral BID    enoxaparin  40 mg Subcutaneous Daily    gabapentin  300 mg Oral TID    ibuprofen  800 mg Oral Q8H    metoprolol succinate  50 mg Oral Daily    mupirocin  1 g Nasal BID    pantoprazole  40 mg Oral Daily     PRN Meds:   ondansetron    oxyCODONE    oxyCODONE    sodium chloride 0.9%    sodium chloride 0.9%    traMADol        Objective:     Vital Signs (Most Recent):  Temp: 97.6 °F (36.4 °C) (11/05/19 0842)  Pulse: 94 (11/05/19 1035)  Resp: 20 (11/05/19 0842)  BP: 124/74 (11/05/19 1035)  SpO2: 99 % (11/05/19 0842) Vital Signs (24h Range):  Temp:  [97.6 °F (36.4 °C)-99.3 °F (37.4 °C)] 97.6 °F (36.4 °C)  Pulse:  [] 94  Resp:  [14-20] 20  SpO2:  [93 %-99 %] 99 %  BP: (114-172)/(74-95) 124/74     Intake/Output - Last 3 Shifts       11/03 0700 - 11/04 0659 11/04 0700 - 11/05 0659 11/05 0700 - 11/06 0659    P.O.  630 800    I.V. (mL/kg)  2036.7 (18.3)     Total Intake(mL/kg)  2666.7 (24) 800 (7.2)    Urine (mL/kg/hr)  3085 (1.2) 180 (0.4)    Stool   0    Blood  150     Total Output  3235 180    Net  -568.3 +620           Stool Occurrence   1 x          Physical Exam   Constitutional: He is oriented to person, place, and time. He appears well-developed and well-nourished. No distress.   Cardiovascular: Normal rate, regular rhythm and normal heart sounds.   Pulmonary/Chest: Effort normal and breath sounds normal. No respiratory distress. He has no wheezes. He has no rales.   Abdominal: Soft. He exhibits no distension and no mass. There is no tenderness. There is no guarding.   abd inc line healing well  Pos for bm with flatus    Musculoskeletal: Normal range of motion.   Neurological: He is alert and oriented to person, place, and time.   Skin: Skin is warm and dry.   Psychiatric: He has a normal mood and affect. His behavior is normal. Judgment and thought content normal.   Nursing note and vitals reviewed.        Significant Labs:  BMP (Last 3 Results):   Recent Labs   Lab 11/05/19  0329   *      K 4.1      CO2 22*   BUN 12   CREATININE 0.9   CALCIUM 8.6*   MG 1.9     CBC (Last 3 Results):   Recent Labs   Lab 11/05/19  0329   WBC 9.07   RBC 4.08*   HGB 12.6*   HCT 38.0*      MCV 93   MCH 30.9   MCHC 33.2       Significant Diagnostics:  None

## 2019-11-06 VITALS
HEART RATE: 89 BPM | TEMPERATURE: 98 F | OXYGEN SATURATION: 95 % | DIASTOLIC BLOOD PRESSURE: 89 MMHG | WEIGHT: 245.13 LBS | HEIGHT: 71 IN | RESPIRATION RATE: 16 BRPM | SYSTOLIC BLOOD PRESSURE: 137 MMHG | BODY MASS INDEX: 34.32 KG/M2

## 2019-11-06 PROCEDURE — 25000003 PHARM REV CODE 250: Performed by: STUDENT IN AN ORGANIZED HEALTH CARE EDUCATION/TRAINING PROGRAM

## 2019-11-06 RX ORDER — OXYCODONE HYDROCHLORIDE 5 MG/1
5 TABLET ORAL EVERY 4 HOURS PRN
Qty: 30 TABLET | Refills: 0 | Status: ON HOLD | OUTPATIENT
Start: 2019-11-06 | End: 2020-02-05 | Stop reason: HOSPADM

## 2019-11-06 RX ORDER — TRAMADOL HYDROCHLORIDE 50 MG/1
50 TABLET ORAL EVERY 6 HOURS PRN
Qty: 30 TABLET | Refills: 0 | Status: SHIPPED | OUTPATIENT
Start: 2019-11-06 | End: 2019-11-13 | Stop reason: SDUPTHER

## 2019-11-06 RX ADMIN — PANTOPRAZOLE SODIUM 40 MG: 40 TABLET, DELAYED RELEASE ORAL at 09:11

## 2019-11-06 RX ADMIN — ALVIMOPAN 12 MG: 12 CAPSULE ORAL at 09:11

## 2019-11-06 RX ADMIN — METOPROLOL SUCCINATE 50 MG: 50 TABLET, EXTENDED RELEASE ORAL at 09:11

## 2019-11-06 RX ADMIN — IBUPROFEN 800 MG: 400 TABLET, FILM COATED ORAL at 05:11

## 2019-11-06 RX ADMIN — GABAPENTIN 300 MG: 300 CAPSULE ORAL at 09:11

## 2019-11-06 RX ADMIN — ACETAMINOPHEN 1000 MG: 500 TABLET ORAL at 05:11

## 2019-11-06 RX ADMIN — TRAMADOL HYDROCHLORIDE 50 MG: 50 TABLET, FILM COATED ORAL at 11:11

## 2019-11-06 NOTE — PLAN OF CARE
placed call to [facility] clinic to schedule pt's post-hosp follow up appt with Dr Somers. See below scheduled follow- up appt listed on AVS.    Future Appointments   Date Time Provider Department Center   11/19/2019  1:15 PM Loi Somers MD [facility] COLON [facility]   11/20/2019  1:45 PM Stacey Montanez MD [facility] GENSUR Richard y   11/29/2019 10:45 AM INJECTION, INFECTIOUS DISEASES [facility] ID INJ Richard y   12/12/2019 11:00 AM LAB, APPOINTMENT [facility] LAB [facility]   4/28/2020 10:45 AM INJECTION, INFECTIOUS DISEASES [facility] ID INJ Richard bl[place]

## 2019-11-06 NOTE — PLAN OF CARE
CM met with patient to discuss today's discharge to home. Patient in agreement with discharge plan. Patient denies need for additional assistance at home. Patient's S.O, Linda is at bedside will provide patient with transportation home. CM informed patient of his follow up appt with Dr Somers, pt verbalized understanding. CM informed staff nurse of pt's discharge plan.    Future Appointments   Date Time Provider Department Center   11/19/2019  1:15 PM Loi Somers MD [facility] COLON [facility]   11/20/2019  1:45 PM Stacey Montanez MD [facility] GENSUR [facility]   11/29/2019 10:45 AM INJECTION, INFECTIOUS DISEASES [facility] ID INJ [facility]   12/12/2019 11:00 AM [facility]   4/28/2020 10:45 AM INJECTION, INFECTIOUS DISEASES [facility] ID INJ [facility]          11/06/19 1059   Final Note   Assessment Type Final Discharge Note   Anticipated Discharge Disposition Home   What phone number can be called within the next 1-3 days to see how you are doing after discharge? [phone]   Hospital Follow Up  Appt(s) scheduled? Yes   Discharge plans and expectations educations in teach back method with documentation complete? Yes

## 2019-11-06 NOTE — DISCHARGE SUMMARY
[facility] [address]  Colorectal Surgery  Discharge Summary      Patient Name: You Seals  MRN: [id number]  Admission Date: 11/4/2019  Hospital Length of Stay: 2 days  Discharge Date and Time: 11/6/2019 11:11 AM  Attending Physician: No att. providers found   Discharging Provider: Roseline Mohamud NP  Primary Care Provider: Marisa French MD     HPI:  42 yo M who presented for elective sigmoid colectomy for history of diverticulitis.    Procedure(s) (LRB):  COLECTOMY, SIGMOID, LAPAROSCOPIC, flex sig, ERAS low (N/A)     Hospital Course:  The patient was admitted postoperatively.  His pain was controlled.  He had a normal post op course.  On day of discharge pt is nurys regular diet, inc line healing well, positive for bm with flatus, ambulating in beck without difficulty, adequate pain control with oral medication, VS stable and afebrile.    2 week fu with Dr. Somers         Significant Diagnostic Studies: Labs:   BMP:   Recent Labs   Lab 11/05/19  0329   *      K 4.1      CO2 22*   BUN 12   CREATININE 0.9   CALCIUM 8.6*   MG 1.9    and CBC   Recent Labs   Lab 11/05/19  0329   WBC 9.07   HGB 12.6*   HCT 38.0*          Pending Diagnostic Studies:     None        Final Active Diagnoses:    Diagnosis Date Noted POA    PRINCIPAL PROBLEM:  Diverticulitis [K57.92] 11/04/2019 Yes    Chronic hepatitis C [B18.2] 08/12/2019 Yes      Problems Resolved During this Admission:      Discharged Condition: good    Disposition: Home or Self Care    Follow Up:  Follow-up Information     Loi Somers MD In 2 weeks.    Specialty:  Colon and Rectal Surgery  Contact information:  [address] [place] [id number]  [phone]                 Patient Instructions:      Lifting restrictions   Order Comments: No lifting anything greater than 5 pounds     Call MD for:  persistent nausea and vomiting or diarrhea     Call MD for:  severe uncontrolled pain     Call MD for:  redness,  tenderness, or signs of infection (pain, swelling, redness, odor or green/yellow discharge around incision site)     Call MD for:  difficulty breathing or increased cough     Call MD for:  severe persistent headache     Call MD for:  worsening rash     Call MD for:  persistent dizziness, light-headedness, or visual disturbances     Call MD for:  increased confusion or weakness     Call MD for:   Order Comments: Call for temp above 101     Medications:  Reconciled Home Medications:      Medication List      START taking these medications    oxyCODONE 5 MG immediate release tablet  Commonly known as:  ROXICODONE  Take 1 tablet (5 mg total) by mouth every 4 (four) hours as needed.     traMADol 50 mg tablet  Commonly known as:  ULTRAM  Take 1 tablet (50 mg total) by mouth every 6 (six) hours as needed.        CONTINUE taking these medications    dicyclomine 20 mg tablet  Commonly known as:  BENTYL  Take 1 tablet (20 mg total) by mouth every 6 (six) hours.     ergocalciferol 50,000 unit Cap  Commonly known as:  ERGOCALCIFEROL  TAKE ONE CAPSULE EVERY WEEK FOR LOW VITAMIN D     Lactobacillus rhamnosus GG 10 billion cell capsule  Commonly known as:  CULTURELLE  Take 1 capsule by mouth once daily.     metoprolol succinate 50 MG 24 hr tablet  Commonly known as:  TOPROL-XL  Take 50 mg by mouth once daily.     omeprazole 20 MG capsule  Commonly known as:  PRILOSEC  Take 20 mg by mouth once daily.        STOP taking these medications    metroNIDAZOLE 500 MG tablet  Commonly known as:  FLAGYL     neomycin 500 mg Tab  Commonly known as:  MYCIFRADIN            Roseline Mohamud NP  Colorectal Surgery  [facility]

## 2019-11-06 NOTE — PLAN OF CARE
Plan of care reviewed with patient.   Alert and oriented x4.  VSS on RA.  Pain managed with tylenol. Patient uses restroom  with adequate urinary output.  Tolerating PO intake with no complains of nausea.  Patient slept between care.  Frequent rounds for safety.  Call light within reach.  Will continue to monitor.        Problem: Adult Inpatient Plan of Care  Goal: Plan of Care Review  Outcome: Ongoing, Progressing  Goal: Patient-Specific Goal (Individualization)  Outcome: Ongoing, Progressing  Goal: Absence of Hospital-Acquired Illness or Injury  Outcome: Ongoing, Progressing  Goal: Optimal Comfort and Wellbeing  Outcome: Ongoing, Progressing  Goal: Readiness for Transition of Care  Outcome: Ongoing, Progressing  Goal: Rounds/Family Conference  Outcome: Ongoing, Progressing     Problem: Fall Injury Risk  Goal: Absence of Fall and Fall-Related Injury  Outcome: Ongoing, Progressing     Problem: Infection  Goal: Infection Symptom Resolution  Outcome: Ongoing, Progressing

## 2019-11-06 NOTE — PLAN OF CARE
11/06/19 1055   Post-Acute Status   Post-Acute Authorization Other   Other Status No Post-Acute Service Needs   This SW in communication with  and medical team. SW will continue to follow for discharge needs and offer support as needed.    No SW needs identified at this time.    Tatianna Mejía LMSW  [facility]  [id number]

## 2019-11-06 NOTE — PROGRESS NOTES
Discharge instructions reviewed. Prescriptions reviewed and given to patient. Pt . Verbalized understanding. All questions answered. PIV d'c'd with cath tip intact and discarded. Pt refuses transport.

## 2019-11-08 ENCOUNTER — PATIENT OUTREACH (OUTPATIENT)
Dept: ADMINISTRATIVE | Facility: CLINIC | Age: 44
End: 2019-11-08

## 2019-11-08 NOTE — PATIENT INSTRUCTIONS
Discharge Instructions for Total Abdominal Colectomy  A total abdominal colectomy is surgery to remove your colon. Your colon, also called the large intestine, is part of your bowel. A colectomy is done to remove disease, such as cancer, polyps, and inflammatory bowel disease, and to relieve the symptoms you have been having, such as bleeding, blockage, and pain.  Activity  · Ask your friends and family to help with chores and errands while you recover.  · Walk on a regular basis. Start with short walks each day. Gradually increase the distance you walk and how often you walk.  · Dont lift anything heavier than 10 pounds for the first 6 weeks after your surgery.  · Dont drive for 2 weeks after surgery or as directed by your doctor. Dont drive while you are taking prescription pain medicine.  · Ask your doctor when you can expect to return to work. Most people are able to return to work within 4 to 6 weeks after surgery.  Other home care  · Diarrhea or loose stools are common after surgery, and can last weeks to months. If you have watery, or bloody diarrhea, call your surgeon. This may be a sign of a bowel infection or other problem.  · Follow the diet prescribed for you in the hospital. Slowly add foods until you get back to your regular diet. If a food gives you stomach or bowel problems, avoid it for a while.  · Initially, you may be on a low fiber diet. After this, adding fiber can help with the diarrhea. If it is severe, your doctor may add a medicine for the diarrhea as well.  · You may use pain medicine as directed by your provider. Discuss your best option before leaving the hospital and at your post operative visit.  · Use nutritional supplements or shakes as directed by your doctor.  · Drink at least 8 glasses of water every day, unless directed otherwise. It's very important to avoid dehydration, especially if you have an ostomy (a bag that collects stool) or diarrhea.   · Take your medicines exactly  as directed. Dont skip doses.  · Shower or bathe as directed by your healthcare provider. Gently wash your incision with soap and water and pat dry.  · Avoid tub baths until the staples in your incision have been removed.  Follow-up care  Make a follow-up appointment as directed by our staff.     When to call your healthcare provider  Call your healthcare provider right away if you have any of the following:  · Fever of 100.4°F (38°C) or higher, or as directed by your healthcare provider  · Diarrhea that lasts more than 3 days  · Nausea and vomiting that wont go away  · Pain in your abdomen that gets worse or isnt relieved by pain medicine  · Drainage or redness around your incision  · Bright red or dark black stools     © 9468-8436 [employer]. [address] [place] [address], [place] [address]. All rights reserved. This information is not intended as a substitute for professional medical care. Always follow your healthcare professional's instructions.

## 2019-11-11 ENCOUNTER — NURSE TRIAGE (OUTPATIENT)
Dept: ADMINISTRATIVE | Facility: CLINIC | Age: 44
End: 2019-11-11

## 2019-11-11 ENCOUNTER — PATIENT MESSAGE (OUTPATIENT)
Dept: SURGERY | Facility: CLINIC | Age: 44
End: 2019-11-11

## 2019-11-11 NOTE — TELEPHONE ENCOUNTER
Reason for Disposition   [1] Caller has NON-URGENT question AND [2] triager unable to answer question    Additional Information   Negative: Sounds like a life-threatening emergency to the triager   Negative: Chest pain   Negative: Difficulty breathing   Negative: Surgical incision symptoms and questions   Negative: [1] Discomfort (pain, burning or stinging) when passing urine AND [2] male   Negative: [1] Discomfort (pain, burning or stinging) when passing urine AND [2] female   Negative: Constipation   Negative: New or worsening leg (calf, thigh) pain   Negative: New or worsening leg swelling   Negative: Dizziness is severe, or persists > 24 hours after surgery   Negative: Pain, redness, swelling, or pus at IV Site   Negative: Symptoms arising from use of a urinary catheter (Nye or Coude)   Negative: Cast problems or questions   Negative: Medication question   Negative: [1] Widespread rash AND [2] bright red, sunburn-like   Negative: [1] Severe headache AND [2] after spinal (epidural) anesthesia   Negative: [1] Vomiting AND [2] persists > 4 hours   Negative: [1] Vomiting AND [2] abdomen looks much more swollen than usual   Negative: [1] Drinking very little AND [2] dehydration suspected (e.g., no urine > 12 hours, very dry mouth, very lightheaded)   Negative: Patient sounds very sick or weak to the triager   Negative: Sounds like a serious complication to the triager   Negative: Fever > 100.5 F (38.1 C)   Negative: [1] SEVERE post-op pain (e.g., excruciating, pain scale 8-10) AND [2] not controlled with pain medications   Negative: [1] Caller has URGENT question AND [2] triager unable to answer question   Negative: [1] Headache AND [2] after spinal (epidural) anesthesia AND [3] not severe   Negative: Fever present > 3 days (72 hours)   Negative: [1] MILD-MODERATE post-op pain (e.g., pain scale 1-7) AND [2] not controlled with pain medications    Protocols used: POST-OP SYMPTOMS AND  "[id number]    You has redness, that extends half of incision area. Girlfriend just noticed the redness today and states that area feels "hot". He doesn't think he has fever. Is otherwise feeling okay, denies any new swelling in the area. Pain is controlled with pain medication. Recommended he send a picture through his patient portal for office review and triage nurse would send a message. Reviewed s/s which would warrant an urgent evaluation. Advised if any new s/s to call back.  "

## 2019-11-11 NOTE — PROGRESS NOTES
"  CRS Office Post Operative Visit    SUBJECTIVE:     Chief Complaint: followup from surgery.     Procedure:    11/04/2019 laparoscopic sigmoid resection for diverticulitis     Significant post operative events:  did well     Pathology:   Diverticulitis and diverticulosis  Focal abscess formation in an area of perforation    Current Status:  Doing well. Eating.  Having regular bowel movements.  Complains of a small amount of erythema and pain around his Pfannenstiel incision. No drainage. No fevers.    Review of Systems:  Review of Systems   Constitutional: Negative for chills, diaphoresis, fever, malaise/fatigue and weight loss.   HENT: Negative for congestion.    Respiratory: Negative for shortness of breath.    Cardiovascular: Negative for chest pain and leg swelling.   Gastrointestinal: Positive for abdominal pain. Negative for blood in stool, constipation, nausea and vomiting.   Genitourinary: Negative for dysuria.   Musculoskeletal: Negative for back pain and myalgias.   Skin: Positive for rash.   Neurological: Negative for dizziness and weakness.   Endo/Heme/Allergies: Does not bruise/bleed easily.   Psychiatric/Behavioral: Negative for depression.       OBJECTIVE:     Vital Signs (Most Recent)  /80 (BP Location: Right arm, Patient Position: Lying, BP Method: Large (Automatic))   Pulse 74   Ht 5' 11" (1.803 m)   Wt 110.3 kg (243 lb 2.7 oz)   BMI 33.91 kg/m²     Physical Exam:  General: White male in no distress   Respiratory: respirations are even and unlabored  Cardiac: regular rate  Abdomen:  Mild erythema around his Pfannenstiel incision.  No underlying fluctuance.  Small amount of underlying induration.  Blanching skin.  Extremities: Warm dry and intact  Anorectal:  Deferred    ASSESSMENT/PLAN:     You was seen today for post-op evaluation.    Diagnoses and all orders for this visit:    Diverticulitis of large intestine without perforation or abscess without bleeding  -     " amoxicillin-clavulanate 875-125mg (AUGMENTIN) 875-125 mg per tablet; Take 1 tablet by mouth every 12 (twelve) hours. for 7 days        43 y.o. male post op from  Laparoscopic sigmoid resection on 11/04/2019 for diverticulitis.  Slight erythema around his Pfannenstiel incision.  Antibiotics given.  I will see him in a week.    TOSHIA Somers MD, FACS  Staff Surgeon  Colon & Rectal Surgery

## 2019-11-12 ENCOUNTER — PATIENT MESSAGE (OUTPATIENT)
Dept: SURGERY | Facility: CLINIC | Age: 44
End: 2019-11-12

## 2019-11-12 ENCOUNTER — OFFICE VISIT (OUTPATIENT)
Dept: SURGERY | Facility: CLINIC | Age: 44
End: 2019-11-12
Payer: MEDICAID

## 2019-11-12 ENCOUNTER — HOSPITAL ENCOUNTER (OUTPATIENT)
Facility: HOSPITAL | Age: 44
Discharge: HOME | End: 2019-11-12
Attending: INTERNAL MEDICINE | Admitting: INTERNAL MEDICINE
Payer: MEDICAID

## 2019-11-12 VITALS
WEIGHT: 243.19 LBS | DIASTOLIC BLOOD PRESSURE: 80 MMHG | HEART RATE: 74 BPM | SYSTOLIC BLOOD PRESSURE: 120 MMHG | HEIGHT: 71 IN | BODY MASS INDEX: 34.05 KG/M2

## 2019-11-12 DIAGNOSIS — K43.9 VENTRAL HERNIA WITHOUT OBSTRUCTION OR GANGRENE: Primary | ICD-10-CM

## 2019-11-12 DIAGNOSIS — K57.32 DIVERTICULITIS OF LARGE INTESTINE WITHOUT PERFORATION OR ABSCESS WITHOUT BLEEDING: Primary | ICD-10-CM

## 2019-11-12 PROCEDURE — 99024 POSTOP FOLLOW-UP VISIT: CPT | Mod: ,,, | Performed by: COLON & RECTAL SURGERY

## 2019-11-12 PROCEDURE — 99213 OFFICE O/P EST LOW 20 MIN: CPT | Mod: PBBFAC | Performed by: COLON & RECTAL SURGERY

## 2019-11-12 PROCEDURE — 99999 PR PBB SHADOW E&M-EST. PATIENT-LVL III: CPT | Mod: PBBFAC,,, | Performed by: COLON & RECTAL SURGERY

## 2019-11-12 PROCEDURE — 99999 PR PBB SHADOW E&M-EST. PATIENT-LVL III: ICD-10-PCS | Mod: PBBFAC,,, | Performed by: COLON & RECTAL SURGERY

## 2019-11-12 PROCEDURE — 99024 PR POST-OP FOLLOW-UP VISIT: ICD-10-PCS | Mod: ,,, | Performed by: COLON & RECTAL SURGERY

## 2019-11-12 RX ORDER — AMOXICILLIN AND CLAVULANATE POTASSIUM 875; 125 MG/1; MG/1
1 TABLET, FILM COATED ORAL EVERY 12 HOURS
Qty: 14 TABLET | Refills: 0 | Status: SHIPPED | OUTPATIENT
Start: 2019-11-12 | End: 2019-11-19

## 2019-11-12 RX ORDER — HYOSCYAMINE SULFATE 0.125 MG
TABLET ORAL
Refills: 5 | COMMUNITY
Start: 2019-10-25

## 2019-11-12 NOTE — OR NURSING
Manometry no preformed.  Cancelled after check in but prior to admit.  Pt recently had abdominal surgery and is still in pain.  He is afraid of coughing and gaging with procedure.  Patient decided to reschedule procedure.

## 2019-11-12 NOTE — PHYSICIAN QUERY
PT Name: You Seals  MR #: [id number]    Physician Query Form - Pathology Findings Clarification     CDS/: Mariangel Carreno RN, CDS               Contact information: [email or web address]                                                                                                                        [phone]  This form is a permanent document in the medical record.     Query Date: November 12, 2019      By submitting this query, we are merely seeking further clarification of documentation.  Please utilize your independent clinical judgment when addressing the question(s) below.      The medical record contains the following:     Findings Supporting Clinical Information Location in Medical Record   Focal abscess formation in an area of perforation FINAL PATHOLOGIC DIAGNOSIS  1. Colon, sigmoid (resection):  Diverticulitis and diverticulosis  Focal abscess formation in an area of perforation Path Report      Collected: 11/4      Resulted: 11/11     Please document the clinical significance of the Pathologists findings of ____Focal abscess formation in an area of perforation____.    [x   ] I agree with the Pathology Findings   [   ] I do not agree with the Pathology Findings   [   ] Other/Clarification of Findings:   [   ] Clinically Insignificant   [  ] Clinically Undetermined       Please document in your progress notes daily for the duration of treatment until resolved and include in your discharge summary.

## 2019-11-13 RX ORDER — TRAMADOL HYDROCHLORIDE 50 MG/1
50 TABLET ORAL EVERY 6 HOURS PRN
Qty: 30 TABLET | Refills: 0 | Status: ON HOLD | OUTPATIENT
Start: 2019-11-13 | End: 2020-02-05 | Stop reason: HOSPADM

## 2019-11-15 ENCOUNTER — PATIENT MESSAGE (OUTPATIENT)
Dept: SURGERY | Facility: CLINIC | Age: 44
End: 2019-11-15

## 2019-11-19 ENCOUNTER — OFFICE VISIT (OUTPATIENT)
Dept: SURGERY | Facility: CLINIC | Age: 44
End: 2019-11-19
Payer: MEDICAID

## 2019-11-19 VITALS
HEIGHT: 71 IN | WEIGHT: 243.63 LBS | BODY MASS INDEX: 34.11 KG/M2 | SYSTOLIC BLOOD PRESSURE: 144 MMHG | HEART RATE: 82 BPM | DIASTOLIC BLOOD PRESSURE: 93 MMHG

## 2019-11-19 DIAGNOSIS — K57.32 DIVERTICULITIS OF LARGE INTESTINE WITHOUT PERFORATION OR ABSCESS WITHOUT BLEEDING: Primary | ICD-10-CM

## 2019-11-19 PROCEDURE — 99213 OFFICE O/P EST LOW 20 MIN: CPT | Mod: PBBFAC | Performed by: COLON & RECTAL SURGERY

## 2019-11-19 PROCEDURE — 99024 POSTOP FOLLOW-UP VISIT: CPT | Mod: ,,, | Performed by: COLON & RECTAL SURGERY

## 2019-11-19 PROCEDURE — 99999 PR PBB SHADOW E&M-EST. PATIENT-LVL III: ICD-10-PCS | Mod: PBBFAC,,, | Performed by: COLON & RECTAL SURGERY

## 2019-11-19 PROCEDURE — 99999 PR PBB SHADOW E&M-EST. PATIENT-LVL III: CPT | Mod: PBBFAC,,, | Performed by: COLON & RECTAL SURGERY

## 2019-11-19 PROCEDURE — 99024 PR POST-OP FOLLOW-UP VISIT: ICD-10-PCS | Mod: ,,, | Performed by: COLON & RECTAL SURGERY

## 2019-11-19 NOTE — PROGRESS NOTES
"  CRS Office Post Operative Visit    SUBJECTIVE:     Chief Complaint: followup from surgery.     Procedure:    11/04/2019 laparoscopic sigmoid resection for diverticulitis     Significant post operative events:  did well     Pathology:   Diverticulitis and diverticulosis  Focal abscess formation in an area of perforation    Current Status:   11/12/19:  Doing well. Eating.  Having regular bowel movements.  Complains of a small amount of erythema and pain around his Pfannenstiel incision. No drainage. No fevers.   - empiric abx given.    11/19/19:  Overall doing very well. A few days ago, he had a moderate amounts of yellow drainage from his Pfannenstiel incision.  This has since resolved.  No fevers or chills.  Regular bowel movements.  Eating well.  Activity level is returning towards normal.    Review of Systems:  Review of Systems   Constitutional: Negative for chills, diaphoresis, fever, malaise/fatigue and weight loss.   HENT: Negative for congestion.    Respiratory: Negative for shortness of breath.    Cardiovascular: Negative for chest pain and leg swelling.   Gastrointestinal: Positive for abdominal pain. Negative for blood in stool, constipation, nausea and vomiting.   Genitourinary: Negative for dysuria.   Musculoskeletal: Negative for back pain and myalgias.   Skin: Positive for rash.   Neurological: Negative for dizziness and weakness.   Endo/Heme/Allergies: Does not bruise/bleed easily.   Psychiatric/Behavioral: Negative for depression.       OBJECTIVE:     Vital Signs (Most Recent)  BP (!) 144/93 (BP Location: Left arm, Patient Position: Sitting, BP Method: Large (Automatic))   Pulse 82   Ht 5' 11" (1.803 m)   Wt 110.5 kg (243 lb 9.7 oz)   BMI 33.98 kg/m²     Physical Exam:  General: White male in no distress   Respiratory: respirations are even and unlabored  Cardiac: regular rate  Abdomen:  Mild erythema around this Pfannenstiel incision. In the right lateral aspect of the Pfannenstiel incision, " there is a small amount of induration.  The incision was opened and serous fluid was evacuated. Dressings were applied. He is otherwise doing well in the abdomen is normal appearing.  Extremities: Warm dry and intact  Anorectal:  Deferred    ASSESSMENT/PLAN:     You was seen today for post-op evaluation.    Diagnoses and all orders for this visit:    Diverticulitis of large intestine without perforation or abscess without bleeding        43 y.o. male post op from  Laparoscopic sigmoid resection on 11/04/2019 for diverticulitis.  Small underlying seroma from his Pfannenstiel incision was drain today.  Reassurance provided.  I will see him back in 4 weeks for a final wound check.    TOSHIA Somers MD, FACS  Staff Surgeon  Colon & Rectal Surgery

## 2019-11-25 ENCOUNTER — PATIENT MESSAGE (OUTPATIENT)
Dept: GASTROENTEROLOGY | Facility: CLINIC | Age: 44
End: 2019-11-25

## 2019-11-29 ENCOUNTER — CLINICAL SUPPORT (OUTPATIENT)
Dept: INFECTIOUS DISEASES | Facility: CLINIC | Age: 44
End: 2019-11-29
Payer: MEDICAID

## 2019-11-29 PROCEDURE — 90471 IMMUNIZATION ADMIN: CPT | Mod: PBBFAC

## 2019-12-12 ENCOUNTER — LAB VISIT (OUTPATIENT)
Dept: LAB | Facility: HOSPITAL | Age: 44
End: 2019-12-12
Payer: MEDICAID

## 2019-12-12 DIAGNOSIS — Z86.19 HISTORY OF HEPATITIS C: ICD-10-CM

## 2019-12-12 PROCEDURE — 87522 HEPATITIS C REVRS TRNSCRPJ: CPT

## 2019-12-12 PROCEDURE — 36415 COLL VENOUS BLD VENIPUNCTURE: CPT

## 2019-12-15 ENCOUNTER — TELEPHONE (OUTPATIENT)
Dept: HEPATOLOGY | Facility: CLINIC | Age: 44
End: 2019-12-15

## 2019-12-15 DIAGNOSIS — Z86.19 HISTORY OF HEPATITIS C: Primary | ICD-10-CM

## 2019-12-15 LAB
HCV RNA SERPL NAA+PROBE-LOG IU: <1.08 LOG (10) IU/ML
HCV RNA SERPL QL NAA+PROBE: NOT DETECTED IU/ML
HCV RNA SPEC NAA+PROBE-ACNC: <12 IU/ML

## 2019-12-16 NOTE — TELEPHONE ENCOUNTER
12/12/19 labs:  HCV negative    pls schedule HCV RNA in 3 months  Thanks    Pt notified via [facility]

## 2019-12-17 ENCOUNTER — OFFICE VISIT (OUTPATIENT)
Dept: SURGERY | Facility: CLINIC | Age: 44
End: 2019-12-17
Payer: MEDICAID

## 2019-12-17 VITALS
BODY MASS INDEX: 36.02 KG/M2 | HEART RATE: 72 BPM | DIASTOLIC BLOOD PRESSURE: 82 MMHG | SYSTOLIC BLOOD PRESSURE: 126 MMHG | HEIGHT: 71 IN | WEIGHT: 257.25 LBS

## 2019-12-17 DIAGNOSIS — K57.92 DIVERTICULITIS: Primary | ICD-10-CM

## 2019-12-17 DIAGNOSIS — K44.9 PARAESOPHAGEAL HERNIA: ICD-10-CM

## 2019-12-17 PROCEDURE — 99024 PR POST-OP FOLLOW-UP VISIT: ICD-10-PCS | Mod: ,,, | Performed by: COLON & RECTAL SURGERY

## 2019-12-17 PROCEDURE — 99024 POSTOP FOLLOW-UP VISIT: CPT | Mod: ,,, | Performed by: COLON & RECTAL SURGERY

## 2019-12-17 PROCEDURE — 99999 PR PBB SHADOW E&M-EST. PATIENT-LVL III: CPT | Mod: PBBFAC,,, | Performed by: COLON & RECTAL SURGERY

## 2019-12-17 PROCEDURE — 99213 OFFICE O/P EST LOW 20 MIN: CPT | Mod: PBBFAC | Performed by: COLON & RECTAL SURGERY

## 2019-12-17 PROCEDURE — 99999 PR PBB SHADOW E&M-EST. PATIENT-LVL III: ICD-10-PCS | Mod: PBBFAC,,, | Performed by: COLON & RECTAL SURGERY

## 2019-12-17 RX ORDER — LAMOTRIGINE 25 MG/1
TABLET ORAL 2 TIMES DAILY
COMMUNITY
Start: 2019-12-11

## 2019-12-17 NOTE — PROGRESS NOTES
"  CRS Office Post Operative Visit    SUBJECTIVE:     Chief Complaint: followup from surgery.     Procedure:    11/04/2019 laparoscopic sigmoid resection for diverticulitis     Significant post operative events:  did well     Pathology:   Diverticulitis and diverticulosis  Focal abscess formation in an area of perforation    Current Status:   11/12/19:  Doing well. Eating.  Having regular bowel movements.  Complains of a small amount of erythema and pain around his Pfannenstiel incision. No drainage. No fevers.   - empiric abx given.    11/19/19:  Overall doing very well. A few days ago, he had a moderate amounts of yellow drainage from his Pfannenstiel incision.  This has since resolved.  No fevers or chills.  Regular bowel movements.  Eating well.  Activity level is returning towards normal.  12/17/19:  Presents for evaluation.  Had 1 episode of serosanguineous drainage from his Pfannenstiel incision.  No further episodes of drainage.  Overall feels well.  Eating well. Having regular bowel movements.  No significant abdominal pain.  Return to all normal activity.    Review of Systems:  Review of Systems   Constitutional: Negative for chills, diaphoresis, fever, malaise/fatigue and weight loss.   HENT: Negative for congestion.    Respiratory: Negative for shortness of breath.    Cardiovascular: Negative for chest pain and leg swelling.   Gastrointestinal: Negative for abdominal pain, blood in stool, constipation, nausea and vomiting.   Genitourinary: Negative for dysuria.   Musculoskeletal: Negative for back pain and myalgias.   Skin: Negative for rash.   Neurological: Negative for dizziness and weakness.   Endo/Heme/Allergies: Does not bruise/bleed easily.   Psychiatric/Behavioral: Negative for depression.       OBJECTIVE:     Vital Signs (Most Recent)  /82 (BP Location: Right arm, Patient Position: Sitting, BP Method: Large (Automatic))   Pulse 72   Ht 5' 11" (1.803 m)   Wt 116.7 kg (257 lb 4.4 oz)   BMI " 35.88 kg/m²     Physical Exam:  General: White male in no distress   Respiratory: respirations are even and unlabored  Cardiac: regular rate  Abdomen:  Pfannenstiel incision is healing well.  Port sites have all healed well. No hernia.  Extremities: Warm dry and intact  Anorectal:  Deferred    ASSESSMENT/PLAN:     You was seen today for post-op evaluation.    Diagnoses and all orders for this visit:    Diverticulitis    Paraesophageal hernia  -     Ambulatory Referral to General Surgery        44 y.o. male post op from  Laparoscopic sigmoid resection on 11/04/2019 for diverticulitis.  He is overall healing very well. He can return to all normal activities.  We also discussed management of his internal hemorrhoids seen on his last colonoscopy.  Recommended decreasing the amount of time spent on the toilet to less than 5 min.    TOSHIA Somers MD, FACS  Staff Surgeon  Colon & Rectal Surgery

## 2020-01-10 ENCOUNTER — TELEPHONE (OUTPATIENT)
Dept: SURGERY | Facility: CLINIC | Age: 45
End: 2020-01-10

## 2020-01-10 NOTE — TELEPHONE ENCOUNTER
----- Message from Gia Adkins sent at 1/10/2020  2:26 PM CST -----  Contact: pt portal    To: Patient Appointment Schedule Request Mailing List  Subject: Appointment Request    Appointment Request From: You Seals    With Provider: Morris James    Preferred Date Range: 12/30/2019 - 1/28/2020    Preferred Times: Any time    Reason for visit: Hernia    Comments:  Surgical consult

## 2020-01-10 NOTE — TELEPHONE ENCOUNTER
Returned pt significant other's phone call in reference to scheduling an appt.  She states that they already have an appt. with Dr. Montanez set up for 1/29, but after having surgery with Dr. Somers would prefer to be seen with Dr. Miller.   Will inform Dr. Montanez of pt decision and pt scheduled to see Dr. Miller on 1/27 @ 6412.

## 2020-01-19 NOTE — HOSPITAL COURSE
The patient was admitted postoperatively.  His pain was controlled.  He had a normal post op course.  On day of discharge pt is nurys regular diet, inc line healing well, positive for bm with flatus, ambulating in beck without difficulty, adequate pain control with oral medication, VS stable and afebrile.    2 week fu with Dr. Somers    38.4

## 2020-01-21 ENCOUNTER — HOSPITAL ENCOUNTER (OUTPATIENT)
Facility: HOSPITAL | Age: 45
Discharge: HOME | End: 2020-01-21
Attending: INTERNAL MEDICINE | Admitting: INTERNAL MEDICINE
Payer: MEDICAID

## 2020-01-21 VITALS
TEMPERATURE: 98 F | RESPIRATION RATE: 16 BRPM | WEIGHT: 251 LBS | OXYGEN SATURATION: 94 % | HEIGHT: 71 IN | BODY MASS INDEX: 35.14 KG/M2 | HEART RATE: 72 BPM | DIASTOLIC BLOOD PRESSURE: 82 MMHG | SYSTOLIC BLOOD PRESSURE: 130 MMHG

## 2020-01-21 DIAGNOSIS — K44.9 HIATAL HERNIA: ICD-10-CM

## 2020-01-21 PROCEDURE — 91037 PR GERD TST W/ NASAL IMPEDENCE ELECTROD: ICD-10-PCS | Mod: 26,,, | Performed by: INTERNAL MEDICINE

## 2020-01-21 PROCEDURE — 91010 ESOPHAGUS MOTILITY STUDY: CPT | Performed by: INTERNAL MEDICINE

## 2020-01-21 PROCEDURE — 91010 PR ESOPHAGEAL MOTILITY STUDY, MA2METRY: ICD-10-PCS | Mod: 26,,, | Performed by: INTERNAL MEDICINE

## 2020-01-21 PROCEDURE — 91010 ESOPHAGUS MOTILITY STUDY: CPT | Mod: 26,,, | Performed by: INTERNAL MEDICINE

## 2020-01-21 PROCEDURE — 25000003 PHARM REV CODE 250: Performed by: INTERNAL MEDICINE

## 2020-01-21 PROCEDURE — 91037 ESOPH IMPED FUNCTION TEST: CPT | Performed by: INTERNAL MEDICINE

## 2020-01-21 PROCEDURE — 91037 ESOPH IMPED FUNCTION TEST: CPT | Mod: 26,,, | Performed by: INTERNAL MEDICINE

## 2020-01-21 RX ORDER — LIDOCAINE HYDROCHLORIDE 20 MG/ML
JELLY TOPICAL ONCE
Status: COMPLETED | OUTPATIENT
Start: 2020-01-21 | End: 2020-01-21

## 2020-01-21 RX ADMIN — LIDOCAINE HYDROCHLORIDE 10 ML: 20 JELLY TOPICAL at 11:01

## 2020-01-27 ENCOUNTER — HOSPITAL ENCOUNTER (OUTPATIENT)
Dept: CARDIOLOGY | Facility: CLINIC | Age: 45
Discharge: HOME | End: 2020-01-27
Payer: MEDICAID

## 2020-01-27 ENCOUNTER — OFFICE VISIT (OUTPATIENT)
Dept: SURGERY | Facility: CLINIC | Age: 45
End: 2020-01-27
Payer: MEDICAID

## 2020-01-27 VITALS
HEART RATE: 75 BPM | HEIGHT: 71 IN | SYSTOLIC BLOOD PRESSURE: 125 MMHG | DIASTOLIC BLOOD PRESSURE: 81 MMHG | BODY MASS INDEX: 35.56 KG/M2 | WEIGHT: 254 LBS

## 2020-01-27 DIAGNOSIS — K44.9 HIATAL HERNIA WITH GERD: ICD-10-CM

## 2020-01-27 DIAGNOSIS — K21.9 HIATAL HERNIA WITH GERD: ICD-10-CM

## 2020-01-27 DIAGNOSIS — K44.9 HIATAL HERNIA: Primary | ICD-10-CM

## 2020-01-27 DIAGNOSIS — K43.9 EPIGASTRIC HERNIA: ICD-10-CM

## 2020-01-27 PROCEDURE — 93010 EKG 12-LEAD: ICD-10-PCS | Mod: S$PBB,,, | Performed by: INTERNAL MEDICINE

## 2020-01-27 PROCEDURE — 99214 OFFICE O/P EST MOD 30 MIN: CPT | Mod: S$PBB,,, | Performed by: SURGERY

## 2020-01-27 PROCEDURE — 99999 PR PBB SHADOW E&M-EST. PATIENT-LVL III: CPT | Mod: PBBFAC,,, | Performed by: SURGERY

## 2020-01-27 PROCEDURE — 93005 ELECTROCARDIOGRAM TRACING: CPT | Mod: PBBFAC | Performed by: INTERNAL MEDICINE

## 2020-01-27 PROCEDURE — 99213 OFFICE O/P EST LOW 20 MIN: CPT | Mod: PBBFAC,25 | Performed by: SURGERY

## 2020-01-27 PROCEDURE — 93010 ELECTROCARDIOGRAM REPORT: CPT | Mod: S$PBB,,, | Performed by: INTERNAL MEDICINE

## 2020-01-27 PROCEDURE — 99999 PR PBB SHADOW E&M-EST. PATIENT-LVL III: ICD-10-PCS | Mod: PBBFAC,,, | Performed by: SURGERY

## 2020-01-27 PROCEDURE — 99214 PR OFFICE/OUTPT VISIT, EST, LEVL IV, 30-39 MIN: ICD-10-PCS | Mod: S$PBB,,, | Performed by: SURGERY

## 2020-01-27 RX ORDER — METOPROLOL SUCCINATE 100 MG/1
TABLET, EXTENDED RELEASE ORAL NIGHTLY
Status: ON HOLD | COMMUNITY
Start: 2020-01-23 | End: 2020-02-05 | Stop reason: HOSPADM

## 2020-01-27 NOTE — PROGRESS NOTES
History & Physical  General Surgery Clinic    SUBJECTIVE:     Chief complaint: evaluation of hiatal and 'epigastric' hernia    History of Present Illness:  Patient is a 44 y.o. male w/ a PMHx of Hep C and diverticulosis who presents to the clinic for evaluation of an abdominal bulge and a hiatal hernia.   Pt is relatively symptom free currently and only endorses intermittent heartburn but claims that in the past year he has had to reduce his portion size due to regurgitation of solid food.   He denies dysphagia, globus. He had some form of imaging a year ago in [place], [place] that showed that his stomach was in his thorax.     His second issue is a chronic epigastric bulge that he has had since he was a child.   He complains of constant dull, aching pain that prevents him from getting a restful night's sleep.   He denies N/V. He is chronically diarrheal and is seen by a GI physician for diverticulosis.          Review of Systems   Constitutional: Negative for diaphoresis, fever and weight loss.   HENT: Negative for congestion and hearing loss.    Eyes: Negative for photophobia.   Respiratory: Negative for cough and shortness of breath.    Cardiovascular: Negative for chest pain, claudication and leg swelling.   Gastrointestinal: Positive for abdominal pain, diarrhea, heartburn and nausea. Negative for vomiting.   Genitourinary: Negative for dysuria and frequency.   Musculoskeletal: Negative for joint pain and myalgias.   Skin: Negative for rash.   Neurological: Negative for dizziness and headaches.   Endo/Heme/Allergies: Does not bruise/bleed easily.   Psychiatric/Behavioral: Negative for depression.        Past Medical History:  Past Medical History:   Diagnosis Date    Abdominal hernia     Hepatitis C 2018    Hiatal hernia 2018    High cholesterol 2018    History of kidney stones     Hypertension 2018       Past Surgical History:  Past Surgical History:   Procedure Laterality Date    COLONOSCOPY N/A  8/20/2019    Procedure: COLONOSCOPY;  Surgeon: Marce Womack MD;  Location: [facility] ENDO;  Service: Endoscopy;  Laterality: N/A;  Can not arrive before 8 am    ESOPHAGEAL MANOMETRY WITH MEASUREMENT OF IMPEDANCE N/A 1/21/2020    Procedure: MANOMETRY, ESOPHAGUS, WITH IMPEDANCE MEASUREMENT;  Surgeon: Thony Moscoso MD;  Location: [facility] (4TH FLR);  Service: Endoscopy;  Laterality: N/A;  original order placed by kameron sellers-on procedure day, decided to not have it done due to recent surgery. new order placed  1/14 - pt confirmed appt    EXTRACORPOREAL SHOCK WAVE LITHOTRIPSY      GASTROSCOPY      Inginal Hernia  1978    possible appendix removal at this time??    LAPAROSCOPIC SIGMOIDECTOMY N/A 11/4/2019    Procedure: COLECTOMY, SIGMOID, LAPAROSCOPIC, flex sig, ERAS low;  Surgeon: Loi Somers MD;  Location: [facility] OR 2ND FLR;  Service: Colon and Rectal;  Laterality: N/A;       Family History:  Family History   Problem Relation Age of Onset    Fibromyalgia Mother     Heart failure Mother     Hypertension Mother     Diabetes Mother     Heart failure Maternal Grandmother     Stroke Paternal Grandmother     Stroke Paternal Grandfather        Social History:  Social History     Socioeconomic History    Marital status: Single     Spouse name: Not on file    Number of children: Not on file    Years of education: Not on file    Highest education level: Not on file   Occupational History    Not on file   Social Needs    Financial resource strain: Not on file    Food insecurity:     Worry: Not on file     Inability: Not on file    Transportation needs:     Medical: Not on file     Non-medical: Not on file   Tobacco Use    Smoking status: Former Smoker    Smokeless tobacco: Never Used    Tobacco comment: quit 2018   Substance and Sexual Activity    Alcohol use: Not Currently    Drug use: Not Currently     Comment: five months    Sexual activity: Yes   Lifestyle    Physical activity:     Days  "per week: Not on file     Minutes per session: Not on file    Stress: Not on file   Relationships    Social connections:     Talks on phone: Not on file     Gets together: Not on file     Attends [employer] service: Not on file     Active member of club or organization: Not on file     Attends meetings of clubs or organizations: Not on file     Relationship status: Not on file   Other Topics Concern    Not on file   Social History Narrative    Not on file       Medications:  Current Outpatient Medications   Medication Sig Dispense Refill    ergocalciferol (ERGOCALCIFEROL) 50,000 unit Cap TAKE ONE CAPSULE EVERY WEEK FOR LOW VITAMIN D  5    hyoscyamine (ANASPAZ,LEVSIN) 0.125 mg Tab TAKE 1 TABLET (125 MCG TOTAL) BY MOUTH EVERY 4 (FOUR) HOURS AS NEEDED.  5    Lactobacillus rhamnosus GG (CULTURELLE) 10 billion cell capsule Take 1 capsule by mouth once daily.      lamoTRIgine (LAMICTAL) 25 MG tablet       metoprolol succinate (TOPROL-XL) 100 MG 24 hr tablet       metoprolol succinate (TOPROL-XL) 50 MG 24 hr tablet Take 50 mg by mouth once daily.  11    omeprazole (PRILOSEC) 20 MG capsule Take 20 mg by mouth once daily.  11    oxyCODONE (ROXICODONE) 5 MG immediate release tablet Take 1 tablet (5 mg total) by mouth every 4 (four) hours as needed. (Patient not taking: Reported on 11/19/2019) 30 tablet 0    traMADol (ULTRAM) 50 mg tablet Take 1 tablet (50 mg total) by mouth every 6 (six) hours as needed. (Patient not taking: Reported on 11/19/2019) 30 tablet 0     No current facility-administered medications for this visit.        Allergies:  Review of patient's allergies indicates:   Allergen Reactions    Sulfa (sulfonamide antibiotics) Hives       OBJECTIVE:     /81   Pulse 75   Ht 5' 11" (1.803 m)   Wt 115.2 kg (254 lb)   BMI 35.43 kg/m²     Physical Exam   Constitutional: He is oriented to person, place, and time and well-developed, well-nourished, and in no distress.   HENT:   Head: Normocephalic " and atraumatic.   Eyes: EOM are normal.   Neck: Neck supple.   Cardiovascular: Normal rate.   Pulmonary/Chest: Effort normal. No respiratory distress.   Abdominal: Soft. He exhibits no distension.   Positive supraumbilical bulge that is exacerbated by valsalva and coughing.   On palpation, no fascial dehiscence is noted except a small dubious location where patient is particularly tender.     Musculoskeletal: Normal range of motion. He exhibits no edema.   Neurological: He is alert and oriented to person, place, and time.   Skin: Skin is warm and dry.         ASSESSMENT/PLAN:     44 y.o. male with Hep C and diverticulosis who presents with a work up for epigastric hernia and hiatal hernia.    - Hiatal Hernia:   - UGI study and manometry to evaluate for function and anatomy   - Pt educated to present with disc with imaging from OSH on next f/u  -Epigastric bulge:   - PE consistent with diastasis recti vs hernia   - CT scan to evaluate     - RTC after work up complete

## 2020-01-27 NOTE — PROGRESS NOTES
History & Physical  General Surgery Clinic    SUBJECTIVE:     Chief complaint: evaluation of hiatal and 'epigastric' hernia    History of Present Illness:  Patient is a 44 y.o. male w/ a PMHx of Hep C and diverticulosis who presents to the clinic for evaluation of an abdominal bulge and a hiatal hernia.   Pt is relatively symptom free currently and only endorses intermittent heartburn but claims that in the past year he has had to reduce his portion size due to regurgitation of solid food.   He denies dysphagia, globus. He had some form of imaging a year ago in [place], [place] that showed that his stomach was in his thorax.     His second issue is a chronic epigastric bulge that he has had since he was a child.   He complains of constant dull, aching pain that prevents him from getting a restful night's sleep.   He denies N/V. He is chronically diarrheal and is seen by a GI physician for diverticulosis.     Interval: Pt underwent a lap sigmoid resection for his diverticulitis.  His workup was alos completed    UGI: Large HH  Manometry: WNL.  CT: Epigastric hernia and Hital hernia    Presents for scheduling.  No sig symptomatic changes.       Review of Systems   Constitutional: Negative for diaphoresis, fever and weight loss.   HENT: Negative for congestion and hearing loss.    Eyes: Negative for photophobia.   Respiratory: Negative for cough and shortness of breath.    Cardiovascular: Negative for chest pain, claudication and leg swelling.   Gastrointestinal: Positive for abdominal pain, diarrhea, heartburn and nausea. Negative for vomiting.   Genitourinary: Negative for dysuria and frequency.   Musculoskeletal: Negative for joint pain and myalgias.   Skin: Negative for rash.   Neurological: Negative for dizziness and headaches.   Endo/Heme/Allergies: Does not bruise/bleed easily.   Psychiatric/Behavioral: Negative for depression.        Past Medical History:  Past Medical History:   Diagnosis Date    Abdominal  hernia     Hepatitis C 2018    Hiatal hernia 2018    High cholesterol 2018    History of kidney stones     Hypertension 2018       Past Surgical History:  Past Surgical History:   Procedure Laterality Date    COLONOSCOPY N/A 8/20/2019    Procedure: COLONOSCOPY;  Surgeon: Marce Womack MD;  Location: [facility];  Service: Endoscopy;  Laterality: N/A;  Can not arrive before 8 am    ESOPHAGEAL MANOMETRY WITH MEASUREMENT OF IMPEDANCE N/A 1/21/2020    Procedure: MANOMETRY, ESOPHAGUS, WITH IMPEDANCE MEASUREMENT;  Surgeon: Thony Moscoso MD;  Location: [facility] (4TH FLR);  Service: Endoscopy;  Laterality: N/A;  original order placed by kameron sellers-on procedure day, decided to not have it done due to recent surgery. new order placed  1/14 - pt confirmed appt    EXTRACORPOREAL SHOCK WAVE LITHOTRIPSY      GASTROSCOPY      Inginal Hernia  1978    possible appendix removal at this time??    LAPAROSCOPIC SIGMOIDECTOMY N/A 11/4/2019    Procedure: COLECTOMY, SIGMOID, LAPAROSCOPIC, flex sig, ERAS low;  Surgeon: Loi Somers MD;  [facility];  Service: Colon and Rectal;  Laterality: N/A;       Family History:  Family History   Problem Relation Age of Onset    Fibromyalgia Mother     Heart failure Mother     Hypertension Mother     Diabetes Mother     Heart failure Maternal Grandmother     Stroke Paternal Grandmother     Stroke Paternal Grandfather        Social History:  Social History     Socioeconomic History    Marital status: Single     Spouse name: Not on file    Number of children: Not on file    Years of education: Not on file    Highest education level: Not on file   Occupational History    Not on file   Social Needs    Financial resource strain: Not on file    Food insecurity:     Worry: Not on file     Inability: Not on file    Transportation needs:     Medical: Not on file     Non-medical: Not on file   Tobacco Use    Smoking status: Former Smoker    Smokeless  tobacco: Never Used    Tobacco comment: quit 2018   Substance and Sexual Activity    Alcohol use: Not Currently    Drug use: Not Currently     Comment: five months    Sexual activity: Yes   Lifestyle    Physical activity:     Days per week: Not on file     Minutes per session: Not on file    Stress: Not on file   Relationships    Social connections:     Talks on phone: Not on file     Gets together: Not on file     Attends [employer] service: Not on file     Active member of club or organization: Not on file     Attends meetings of clubs or organizations: Not on file     Relationship status: Not on file   Other Topics Concern    Not on file   Social History Narrative    Not on file       Medications:  Current Outpatient Medications   Medication Sig Dispense Refill    ergocalciferol (ERGOCALCIFEROL) 50,000 unit Cap TAKE ONE CAPSULE EVERY WEEK FOR LOW VITAMIN D  5    hyoscyamine (ANASPAZ,LEVSIN) 0.125 mg Tab TAKE 1 TABLET (125 MCG TOTAL) BY MOUTH EVERY 4 (FOUR) HOURS AS NEEDED.  5    Lactobacillus rhamnosus GG (CULTURELLE) 10 billion cell capsule Take 1 capsule by mouth once daily.      lamoTRIgine (LAMICTAL) 25 MG tablet       metoprolol succinate (TOPROL-XL) 100 MG 24 hr tablet       metoprolol succinate (TOPROL-XL) 50 MG 24 hr tablet Take 50 mg by mouth once daily.  11    omeprazole (PRILOSEC) 20 MG capsule Take 20 mg by mouth once daily.  11    oxyCODONE (ROXICODONE) 5 MG immediate release tablet Take 1 tablet (5 mg total) by mouth every 4 (four) hours as needed. (Patient not taking: Reported on 11/19/2019) 30 tablet 0    traMADol (ULTRAM) 50 mg tablet Take 1 tablet (50 mg total) by mouth every 6 (six) hours as needed. (Patient not taking: Reported on 11/19/2019) 30 tablet 0     No current facility-administered medications for this visit.        Allergies:  Review of patient's allergies indicates:   Allergen Reactions    Sulfa (sulfonamide antibiotics) Hives       OBJECTIVE:     /81    "Pulse 75   Ht 5' 11" (1.803 m)   Wt 115.2 kg (254 lb)   BMI 35.43 kg/m²     Physical Exam   Constitutional: He is oriented to person, place, and time and well-developed, well-nourished, and in no distress.   HENT:   Head: Normocephalic and atraumatic.   Eyes: EOM are normal.   Neck: Neck supple.   Cardiovascular: Normal rate.   Pulmonary/Chest: Effort normal. No respiratory distress.   Abdominal: Soft. He exhibits no distension.   Positive supraumbilical bulge that is exacerbated by valsalva and coughing.   On palpation, no fascial dehiscence is noted except a small dubious location where patient is particularly tender.     Musculoskeletal: Normal range of motion. He exhibits no edema.   Neurological: He is alert and oriented to person, place, and time.   Skin: Skin is warm and dry.         ASSESSMENT/PLAN:     44 y.o. male with Hiatal Hernia and Epigastric Hernia    1. To OR for lap HH hernia repiar with mesh and Nissen  2. Epigastric hernia repiar.  3. Consents obtained    -  "

## 2020-01-27 NOTE — H&P (VIEW-ONLY)
History & Physical  General Surgery Clinic    SUBJECTIVE:     Chief complaint: evaluation of hiatal and 'epigastric' hernia    History of Present Illness:  Patient is a 44 y.o. male w/ a PMHx of Hep C and diverticulosis who presents to the clinic for evaluation of an abdominal bulge and a hiatal hernia.   Pt is relatively symptom free currently and only endorses intermittent heartburn but claims that in the past year he has had to reduce his portion size due to regurgitation of solid food.   He denies dysphagia, globus. He had some form of imaging a year ago in [place], [place] that showed that his stomach was in his thorax.     His second issue is a chronic epigastric bulge that he has had since he was a child.   He complains of constant dull, aching pain that prevents him from getting a restful night's sleep.   He denies N/V. He is chronically diarrheal and is seen by a GI physician for diverticulosis.     Interval: Pt underwent a lap sigmoid resection for his diverticulitis.  His workup was alos completed    UGI: Large HH  Manometry: WNL.  CT: Epigastric hernia and Hital hernia    Presents for scheduling.  No sig symptomatic changes.       Review of Systems   Constitutional: Negative for diaphoresis, fever and weight loss.   HENT: Negative for congestion and hearing loss.    Eyes: Negative for photophobia.   Respiratory: Negative for cough and shortness of breath.    Cardiovascular: Negative for chest pain, claudication and leg swelling.   Gastrointestinal: Positive for abdominal pain, diarrhea, heartburn and nausea. Negative for vomiting.   Genitourinary: Negative for dysuria and frequency.   Musculoskeletal: Negative for joint pain and myalgias.   Skin: Negative for rash.   Neurological: Negative for dizziness and headaches.   Endo/Heme/Allergies: Does not bruise/bleed easily.   Psychiatric/Behavioral: Negative for depression.        Past Medical History:  Past Medical History:   Diagnosis Date    Abdominal  hernia     Hepatitis C 2018    Hiatal hernia 2018    High cholesterol 2018    History of kidney stones     Hypertension 2018       Past Surgical History:  Past Surgical History:   Procedure Laterality Date    COLONOSCOPY N/A 8/20/2019    Procedure: COLONOSCOPY;  Surgeon: Marce Womack MD;  Location: [facility];  Service: Endoscopy;  Laterality: N/A;  Can not arrive before 8 am    ESOPHAGEAL MANOMETRY WITH MEASUREMENT OF IMPEDANCE N/A 1/21/2020    Procedure: MANOMETRY, ESOPHAGUS, WITH IMPEDANCE MEASUREMENT;  Surgeon: Thony Moscoso MD;  Location: [facility] (4TH FLR);  Service: Endoscopy;  Laterality: N/A;  original order placed by kameron sellers-on procedure day, decided to not have it done due to recent surgery. new order placed  1/14 - pt confirmed appt    EXTRACORPOREAL SHOCK WAVE LITHOTRIPSY      GASTROSCOPY      Inginal Hernia  1978    possible appendix removal at this time??    LAPAROSCOPIC SIGMOIDECTOMY N/A 11/4/2019    Procedure: COLECTOMY, SIGMOID, LAPAROSCOPIC, flex sig, ERAS low;  Surgeon: Loi Somers MD;  [facility];  Service: Colon and Rectal;  Laterality: N/A;       Family History:  Family History   Problem Relation Age of Onset    Fibromyalgia Mother     Heart failure Mother     Hypertension Mother     Diabetes Mother     Heart failure Maternal Grandmother     Stroke Paternal Grandmother     Stroke Paternal Grandfather        Social History:  Social History     Socioeconomic History    Marital status: Single     Spouse name: Not on file    Number of children: Not on file    Years of education: Not on file    Highest education level: Not on file   Occupational History    Not on file   Social Needs    Financial resource strain: Not on file    Food insecurity:     Worry: Not on file     Inability: Not on file    Transportation needs:     Medical: Not on file     Non-medical: Not on file   Tobacco Use    Smoking status: Former Smoker    Smokeless  tobacco: Never Used    Tobacco comment: quit 2018   Substance and Sexual Activity    Alcohol use: Not Currently    Drug use: Not Currently     Comment: five months    Sexual activity: Yes   Lifestyle    Physical activity:     Days per week: Not on file     Minutes per session: Not on file    Stress: Not on file   Relationships    Social connections:     Talks on phone: Not on file     Gets together: Not on file     Attends Yazidi service: Not on file     Active member of club or organization: Not on file     Attends meetings of clubs or organizations: Not on file     Relationship status: Not on file   Other Topics Concern    Not on file   Social History Narrative    Not on file       Medications:  Current Outpatient Medications   Medication Sig Dispense Refill    ergocalciferol (ERGOCALCIFEROL) 50,000 unit Cap TAKE ONE CAPSULE EVERY WEEK FOR LOW VITAMIN D  5    hyoscyamine (ANASPAZ,LEVSIN) 0.125 mg Tab TAKE 1 TABLET (125 MCG TOTAL) BY MOUTH EVERY 4 (FOUR) HOURS AS NEEDED.  5    Lactobacillus rhamnosus GG (CULTURELLE) 10 billion cell capsule Take 1 capsule by mouth once daily.      lamoTRIgine (LAMICTAL) 25 MG tablet       metoprolol succinate (TOPROL-XL) 100 MG 24 hr tablet       metoprolol succinate (TOPROL-XL) 50 MG 24 hr tablet Take 50 mg by mouth once daily.  11    omeprazole (PRILOSEC) 20 MG capsule Take 20 mg by mouth once daily.  11    oxyCODONE (ROXICODONE) 5 MG immediate release tablet Take 1 tablet (5 mg total) by mouth every 4 (four) hours as needed. (Patient not taking: Reported on 11/19/2019) 30 tablet 0    traMADol (ULTRAM) 50 mg tablet Take 1 tablet (50 mg total) by mouth every 6 (six) hours as needed. (Patient not taking: Reported on 11/19/2019) 30 tablet 0     No current facility-administered medications for this visit.        Allergies:  Review of patient's allergies indicates:   Allergen Reactions    Sulfa (sulfonamide antibiotics) Hives       OBJECTIVE:     /81    "Pulse 75   Ht 5' 11" (1.803 m)   Wt 115.2 kg (254 lb)   BMI 35.43 kg/m²     Physical Exam   Constitutional: He is oriented to person, place, and time and well-developed, well-nourished, and in no distress.   HENT:   Head: Normocephalic and atraumatic.   Eyes: EOM are normal.   Neck: Neck supple.   Cardiovascular: Normal rate.   Pulmonary/Chest: Effort normal. No respiratory distress.   Abdominal: Soft. He exhibits no distension.   Positive supraumbilical bulge that is exacerbated by valsalva and coughing.   On palpation, no fascial dehiscence is noted except a small dubious location where patient is particularly tender.     Musculoskeletal: Normal range of motion. He exhibits no edema.   Neurological: He is alert and oriented to person, place, and time.   Skin: Skin is warm and dry.         ASSESSMENT/PLAN:     44 y.o. male with Hiatal Hernia and Epigastric Hernia    1. To OR for lap HH hernia repiar with mesh and Nissen  2. Epigastric hernia repiar.  3. Consents obtained    -  "

## 2020-01-28 RX ORDER — LIDOCAINE HYDROCHLORIDE 10 MG/ML
1 INJECTION, SOLUTION EPIDURAL; INFILTRATION; INTRACAUDAL; PERINEURAL ONCE
Status: CANCELLED | OUTPATIENT
Start: 2020-01-28 | End: 2020-01-28

## 2020-02-03 ENCOUNTER — TELEPHONE (OUTPATIENT)
Dept: SURGERY | Facility: CLINIC | Age: 45
End: 2020-02-03

## 2020-02-04 ENCOUNTER — HOSPITAL ENCOUNTER (OUTPATIENT)
Facility: HOSPITAL | Age: 45
Discharge: HOME | End: 2020-02-05
Attending: SURGERY | Admitting: SURGERY
Payer: MEDICAID

## 2020-02-04 ENCOUNTER — ANESTHESIA EVENT (OUTPATIENT)
Dept: SURGERY | Facility: HOSPITAL | Age: 45
End: 2020-02-04
Payer: MEDICAID

## 2020-02-04 ENCOUNTER — ANESTHESIA (OUTPATIENT)
Dept: SURGERY | Facility: HOSPITAL | Age: 45
End: 2020-02-04
Payer: MEDICAID

## 2020-02-04 DIAGNOSIS — K44.9 HIATAL HERNIA: Primary | ICD-10-CM

## 2020-02-04 PROCEDURE — 25000003 PHARM REV CODE 250: Performed by: ANESTHESIOLOGY

## 2020-02-04 PROCEDURE — 36000711: Performed by: SURGERY

## 2020-02-04 PROCEDURE — 63600175 PHARM REV CODE 636 W HCPCS

## 2020-02-04 PROCEDURE — 99900035 HC TECH TIME PER 15 MIN (STAT)

## 2020-02-04 PROCEDURE — 63600175 PHARM REV CODE 636 W HCPCS: Performed by: SURGERY

## 2020-02-04 PROCEDURE — 63600175 PHARM REV CODE 636 W HCPCS: Performed by: ANESTHESIOLOGY

## 2020-02-04 PROCEDURE — 25000003 PHARM REV CODE 250: Performed by: NURSE ANESTHETIST, CERTIFIED REGISTERED

## 2020-02-04 PROCEDURE — 00790 ANES IPER UPR ABD NOS: CPT | Performed by: SURGERY

## 2020-02-04 PROCEDURE — 43282 LAP PARAESOPH HER RPR W/MESH: CPT | Mod: 22,,, | Performed by: SURGERY

## 2020-02-04 PROCEDURE — C9113 INJ PANTOPRAZOLE SODIUM, VIA: HCPCS | Performed by: STUDENT IN AN ORGANIZED HEALTH CARE EDUCATION/TRAINING PROGRAM

## 2020-02-04 PROCEDURE — 63600175 PHARM REV CODE 636 W HCPCS: Performed by: STUDENT IN AN ORGANIZED HEALTH CARE EDUCATION/TRAINING PROGRAM

## 2020-02-04 PROCEDURE — D9220A PRA ANESTHESIA: Mod: ANES,,, | Performed by: ANESTHESIOLOGY

## 2020-02-04 PROCEDURE — 25000003 PHARM REV CODE 250: Performed by: STUDENT IN AN ORGANIZED HEALTH CARE EDUCATION/TRAINING PROGRAM

## 2020-02-04 PROCEDURE — 71000033 HC RECOVERY, INTIAL HOUR: Performed by: SURGERY

## 2020-02-04 PROCEDURE — 25000003 PHARM REV CODE 250: Performed by: SURGERY

## 2020-02-04 PROCEDURE — 94761 N-INVAS EAR/PLS OXIMETRY MLT: CPT | Mod: 59

## 2020-02-04 PROCEDURE — C1781 MESH (IMPLANTABLE): HCPCS | Performed by: SURGERY

## 2020-02-04 PROCEDURE — D9220A PRA ANESTHESIA: ICD-10-PCS | Mod: CRNA,,, | Performed by: NURSE ANESTHETIST, CERTIFIED REGISTERED

## 2020-02-04 PROCEDURE — D9220A PRA ANESTHESIA: ICD-10-PCS | Mod: ANES,,, | Performed by: ANESTHESIOLOGY

## 2020-02-04 PROCEDURE — D9220A PRA ANESTHESIA: Mod: CRNA,,, | Performed by: NURSE ANESTHETIST, CERTIFIED REGISTERED

## 2020-02-04 PROCEDURE — 43282 PR LAP, REPAIR PARAESOPHAGEAL HERNIA, INCL FUNDOPLASTY W/ MESH: ICD-10-PCS | Mod: 22,,, | Performed by: SURGERY

## 2020-02-04 PROCEDURE — 37000009 HC ANESTHESIA EA ADD 15 MINS: Performed by: SURGERY

## 2020-02-04 PROCEDURE — 36000710: Performed by: SURGERY

## 2020-02-04 PROCEDURE — 27201423 OPTIME MED/SURG SUP & DEVICES STERILE SUPPLY: Performed by: SURGERY

## 2020-02-04 PROCEDURE — 37000008 HC ANESTHESIA 1ST 15 MINUTES: Performed by: SURGERY

## 2020-02-04 PROCEDURE — 94799 UNLISTED PULMONARY SVC/PX: CPT

## 2020-02-04 DEVICE — REINFORCEMENT BIO TISSUE: Type: IMPLANTABLE DEVICE | Site: ABDOMEN | Status: FUNCTIONAL

## 2020-02-04 RX ORDER — EPHEDRINE SULFATE 50 MG/ML
INJECTION, SOLUTION INTRAVENOUS
Status: DISCONTINUED | OUTPATIENT
Start: 2020-02-04 | End: 2020-02-04

## 2020-02-04 RX ORDER — BUPIVACAINE HYDROCHLORIDE 2.5 MG/ML
INJECTION, SOLUTION EPIDURAL; INFILTRATION; INTRACAUDAL
Status: DISCONTINUED | OUTPATIENT
Start: 2020-02-04 | End: 2020-02-04 | Stop reason: HOSPADM

## 2020-02-04 RX ORDER — ONDANSETRON 2 MG/ML
INJECTION INTRAMUSCULAR; INTRAVENOUS
Status: DISCONTINUED | OUTPATIENT
Start: 2020-02-04 | End: 2020-02-04

## 2020-02-04 RX ORDER — LAMOTRIGINE 25 MG/1
25 TABLET ORAL 2 TIMES DAILY
Status: DISCONTINUED | OUTPATIENT
Start: 2020-02-05 | End: 2020-02-05 | Stop reason: HOSPADM

## 2020-02-04 RX ORDER — LIDOCAINE HYDROCHLORIDE 10 MG/ML
1 INJECTION INFILTRATION; PERINEURAL ONCE
Status: COMPLETED | OUTPATIENT
Start: 2020-02-04 | End: 2020-02-04

## 2020-02-04 RX ORDER — PANTOPRAZOLE SODIUM 40 MG/10ML
40 INJECTION, POWDER, LYOPHILIZED, FOR SOLUTION INTRAVENOUS 2 TIMES DAILY
Status: DISCONTINUED | OUTPATIENT
Start: 2020-02-04 | End: 2020-02-05 | Stop reason: HOSPADM

## 2020-02-04 RX ORDER — ACETAMINOPHEN 10 MG/ML
1000 INJECTION, SOLUTION INTRAVENOUS ONCE
Status: DISCONTINUED | OUTPATIENT
Start: 2020-02-04 | End: 2020-02-05

## 2020-02-04 RX ORDER — ONDANSETRON 2 MG/ML
4 INJECTION INTRAMUSCULAR; INTRAVENOUS EVERY 6 HOURS
Status: DISCONTINUED | OUTPATIENT
Start: 2020-02-04 | End: 2020-02-05

## 2020-02-04 RX ORDER — CEFAZOLIN SODIUM 1 G/3ML
2 INJECTION, POWDER, FOR SOLUTION INTRAMUSCULAR; INTRAVENOUS
Status: COMPLETED | OUTPATIENT
Start: 2020-02-04 | End: 2020-02-04

## 2020-02-04 RX ORDER — FENTANYL CITRATE 50 UG/ML
INJECTION, SOLUTION INTRAMUSCULAR; INTRAVENOUS
Status: DISCONTINUED | OUTPATIENT
Start: 2020-02-04 | End: 2020-02-04

## 2020-02-04 RX ORDER — LIDOCAINE HYDROCHLORIDE 10 MG/ML
1 INJECTION, SOLUTION EPIDURAL; INFILTRATION; INTRACAUDAL; PERINEURAL ONCE
Status: COMPLETED | OUTPATIENT
Start: 2020-02-04 | End: 2020-02-04

## 2020-02-04 RX ORDER — FENTANYL CITRATE 50 UG/ML
50 INJECTION, SOLUTION INTRAMUSCULAR; INTRAVENOUS EVERY 5 MIN PRN
Status: DISCONTINUED | OUTPATIENT
Start: 2020-02-04 | End: 2020-02-04 | Stop reason: HOSPADM

## 2020-02-04 RX ORDER — PROPOFOL 10 MG/ML
VIAL (ML) INTRAVENOUS
Status: DISCONTINUED | OUTPATIENT
Start: 2020-02-04 | End: 2020-02-04

## 2020-02-04 RX ORDER — SODIUM CHLORIDE 9 MG/ML
INJECTION, SOLUTION INTRAVENOUS CONTINUOUS
Status: DISCONTINUED | OUTPATIENT
Start: 2020-02-04 | End: 2020-02-05

## 2020-02-04 RX ORDER — MIDAZOLAM HYDROCHLORIDE 1 MG/ML
INJECTION, SOLUTION INTRAMUSCULAR; INTRAVENOUS
Status: DISCONTINUED | OUTPATIENT
Start: 2020-02-04 | End: 2020-02-04

## 2020-02-04 RX ORDER — HYDROCODONE BITARTRATE AND ACETAMINOPHEN 7.5; 325 MG/15ML; MG/15ML
15 SOLUTION ORAL EVERY 4 HOURS PRN
Status: DISCONTINUED | OUTPATIENT
Start: 2020-02-05 | End: 2020-02-05 | Stop reason: HOSPADM

## 2020-02-04 RX ORDER — KETAMINE HCL IN 0.9 % NACL 50 MG/5 ML
SYRINGE (ML) INTRAVENOUS
Status: DISCONTINUED | OUTPATIENT
Start: 2020-02-04 | End: 2020-02-04

## 2020-02-04 RX ORDER — PHENYLEPHRINE HCL IN 0.9% NACL 1 MG/10 ML
SYRINGE (ML) INTRAVENOUS
Status: DISCONTINUED | OUTPATIENT
Start: 2020-02-04 | End: 2020-02-04

## 2020-02-04 RX ORDER — METOPROLOL TARTRATE 1 MG/ML
5 INJECTION, SOLUTION INTRAVENOUS EVERY 8 HOURS
Status: DISCONTINUED | OUTPATIENT
Start: 2020-02-04 | End: 2020-02-05

## 2020-02-04 RX ORDER — ACETAMINOPHEN 10 MG/ML
INJECTION, SOLUTION INTRAVENOUS
Status: DISCONTINUED | OUTPATIENT
Start: 2020-02-04 | End: 2020-02-04

## 2020-02-04 RX ORDER — NALOXONE HCL 0.4 MG/ML
0.02 VIAL (ML) INJECTION
Status: DISCONTINUED | OUTPATIENT
Start: 2020-02-04 | End: 2020-02-05 | Stop reason: HOSPADM

## 2020-02-04 RX ORDER — ROCURONIUM BROMIDE 10 MG/ML
INJECTION, SOLUTION INTRAVENOUS
Status: DISCONTINUED | OUTPATIENT
Start: 2020-02-04 | End: 2020-02-04

## 2020-02-04 RX ORDER — LIDOCAINE HYDROCHLORIDE AND EPINEPHRINE 10; 10 MG/ML; UG/ML
INJECTION, SOLUTION INFILTRATION; PERINEURAL
Status: DISCONTINUED | OUTPATIENT
Start: 2020-02-04 | End: 2020-02-04 | Stop reason: HOSPADM

## 2020-02-04 RX ORDER — HYDROMORPHONE HCL IN 0.9% NACL 6 MG/30 ML
PATIENT CONTROLLED ANALGESIA SYRINGE INTRAVENOUS CONTINUOUS
Status: DISCONTINUED | OUTPATIENT
Start: 2020-02-04 | End: 2020-02-05

## 2020-02-04 RX ORDER — LIDOCAINE HYDROCHLORIDE 20 MG/ML
INJECTION, SOLUTION EPIDURAL; INFILTRATION; INTRACAUDAL; PERINEURAL
Status: DISCONTINUED | OUTPATIENT
Start: 2020-02-04 | End: 2020-02-04

## 2020-02-04 RX ORDER — BACITRACIN 50000 [IU]/1
INJECTION, POWDER, FOR SOLUTION INTRAMUSCULAR
Status: DISCONTINUED | OUTPATIENT
Start: 2020-02-04 | End: 2020-02-04 | Stop reason: HOSPADM

## 2020-02-04 RX ORDER — SUCCINYLCHOLINE CHLORIDE 20 MG/ML
INJECTION INTRAMUSCULAR; INTRAVENOUS
Status: DISCONTINUED | OUTPATIENT
Start: 2020-02-04 | End: 2020-02-04

## 2020-02-04 RX ORDER — SODIUM CHLORIDE 0.9 % (FLUSH) 0.9 %
3 SYRINGE (ML) INJECTION
Status: DISCONTINUED | OUTPATIENT
Start: 2020-02-04 | End: 2020-02-04 | Stop reason: HOSPADM

## 2020-02-04 RX ORDER — HYDROMORPHONE HCL IN 0.9% NACL 6 MG/30 ML
PATIENT CONTROLLED ANALGESIA SYRINGE INTRAVENOUS
Status: COMPLETED
Start: 2020-02-04 | End: 2020-02-04

## 2020-02-04 RX ORDER — ENOXAPARIN SODIUM 100 MG/ML
40 INJECTION SUBCUTANEOUS EVERY 24 HOURS
Status: CANCELLED | OUTPATIENT
Start: 2020-02-04

## 2020-02-04 RX ADMIN — LIDOCAINE HYDROCHLORIDE 100 MG: 20 INJECTION, SOLUTION EPIDURAL; INFILTRATION; INTRACAUDAL at 01:02

## 2020-02-04 RX ADMIN — SODIUM CHLORIDE: 0.9 INJECTION, SOLUTION INTRAVENOUS at 05:02

## 2020-02-04 RX ADMIN — Medication 200 MCG: at 02:02

## 2020-02-04 RX ADMIN — MIDAZOLAM HYDROCHLORIDE 2 MG: 1 INJECTION, SOLUTION INTRAMUSCULAR; INTRAVENOUS at 01:02

## 2020-02-04 RX ADMIN — FENTANYL CITRATE 100 MCG: 50 INJECTION INTRAMUSCULAR; INTRAVENOUS at 01:02

## 2020-02-04 RX ADMIN — PROPOFOL 50 MG: 10 INJECTION, EMULSION INTRAVENOUS at 02:02

## 2020-02-04 RX ADMIN — ONDANSETRON HYDROCHLORIDE 4 MG: 2 SOLUTION INTRAMUSCULAR; INTRAVENOUS at 01:02

## 2020-02-04 RX ADMIN — SUCCINYLCHOLINE CHLORIDE 130 MG: 20 INJECTION, SOLUTION INTRAMUSCULAR; INTRAVENOUS at 01:02

## 2020-02-04 RX ADMIN — Medication 100 MCG: at 01:02

## 2020-02-04 RX ADMIN — Medication: at 05:02

## 2020-02-04 RX ADMIN — ROCURONIUM BROMIDE 20 MG: 10 INJECTION, SOLUTION INTRAVENOUS at 03:02

## 2020-02-04 RX ADMIN — FENTANYL CITRATE 50 MCG: 50 INJECTION INTRAMUSCULAR; INTRAVENOUS at 05:02

## 2020-02-04 RX ADMIN — LIDOCAINE HYDROCHLORIDE 1 ML: 10 INJECTION, SOLUTION INFILTRATION; PERINEURAL at 12:02

## 2020-02-04 RX ADMIN — METOPROLOL TARTRATE 5 MG: 5 INJECTION INTRAVENOUS at 10:02

## 2020-02-04 RX ADMIN — Medication 200 MCG: at 01:02

## 2020-02-04 RX ADMIN — ONDANSETRON 4 MG: 2 INJECTION INTRAMUSCULAR; INTRAVENOUS at 11:02

## 2020-02-04 RX ADMIN — EPHEDRINE SULFATE 5 MG: 50 INJECTION, SOLUTION INTRAMUSCULAR; INTRAVENOUS; SUBCUTANEOUS at 04:02

## 2020-02-04 RX ADMIN — EPHEDRINE SULFATE 10 MG: 50 INJECTION, SOLUTION INTRAMUSCULAR; INTRAVENOUS; SUBCUTANEOUS at 02:02

## 2020-02-04 RX ADMIN — ROCURONIUM BROMIDE 25 MG: 10 INJECTION, SOLUTION INTRAVENOUS at 02:02

## 2020-02-04 RX ADMIN — ROCURONIUM BROMIDE 10 MG: 10 INJECTION, SOLUTION INTRAVENOUS at 01:02

## 2020-02-04 RX ADMIN — FENTANYL CITRATE 50 MCG: 50 INJECTION INTRAMUSCULAR; INTRAVENOUS at 01:02

## 2020-02-04 RX ADMIN — ROCURONIUM BROMIDE 10 MG: 10 INJECTION, SOLUTION INTRAVENOUS at 03:02

## 2020-02-04 RX ADMIN — PROPOFOL 250 MG: 10 INJECTION, EMULSION INTRAVENOUS at 01:02

## 2020-02-04 RX ADMIN — EPHEDRINE SULFATE 10 MG: 50 INJECTION, SOLUTION INTRAMUSCULAR; INTRAVENOUS; SUBCUTANEOUS at 04:02

## 2020-02-04 RX ADMIN — SODIUM CHLORIDE: 0.9 INJECTION, SOLUTION INTRAVENOUS at 11:02

## 2020-02-04 RX ADMIN — LIDOCAINE HYDROCHLORIDE 0.1 MG: 10 INJECTION, SOLUTION EPIDURAL; INFILTRATION; INTRACAUDAL; PERINEURAL at 11:02

## 2020-02-04 RX ADMIN — SODIUM CHLORIDE: 0.9 INJECTION, SOLUTION INTRAVENOUS at 01:02

## 2020-02-04 RX ADMIN — SUGAMMADEX 230 MG: 100 INJECTION, SOLUTION INTRAVENOUS at 05:02

## 2020-02-04 RX ADMIN — CEFAZOLIN 2 G: 330 INJECTION, POWDER, FOR SOLUTION INTRAMUSCULAR; INTRAVENOUS at 01:02

## 2020-02-04 RX ADMIN — ROCURONIUM BROMIDE 30 MG: 10 INJECTION, SOLUTION INTRAVENOUS at 04:02

## 2020-02-04 RX ADMIN — ONDANSETRON 4 MG: 2 INJECTION INTRAMUSCULAR; INTRAVENOUS at 05:02

## 2020-02-04 RX ADMIN — EPHEDRINE SULFATE 15 MG: 50 INJECTION, SOLUTION INTRAMUSCULAR; INTRAVENOUS; SUBCUTANEOUS at 03:02

## 2020-02-04 RX ADMIN — Medication 100 MCG: at 02:02

## 2020-02-04 RX ADMIN — EPHEDRINE SULFATE 10 MG: 50 INJECTION, SOLUTION INTRAMUSCULAR; INTRAVENOUS; SUBCUTANEOUS at 03:02

## 2020-02-04 RX ADMIN — ROCURONIUM BROMIDE 40 MG: 10 INJECTION, SOLUTION INTRAVENOUS at 01:02

## 2020-02-04 RX ADMIN — PANTOPRAZOLE SODIUM 40 MG: 40 INJECTION, POWDER, FOR SOLUTION INTRAVENOUS at 10:02

## 2020-02-04 RX ADMIN — ROCURONIUM BROMIDE 15 MG: 10 INJECTION, SOLUTION INTRAVENOUS at 03:02

## 2020-02-04 RX ADMIN — Medication 25 MG: at 01:02

## 2020-02-04 RX ADMIN — PROPOFOL 50 MG: 10 INJECTION, EMULSION INTRAVENOUS at 05:02

## 2020-02-04 RX ADMIN — ACETAMINOPHEN 1000 MG: 10 INJECTION, SOLUTION INTRAVENOUS at 02:02

## 2020-02-04 RX ADMIN — Medication 25 MG: at 02:02

## 2020-02-04 NOTE — TRANSFER OF CARE
"Anesthesia Transfer of Care Note    Patient: You Seals    Procedure(s) Performed: Procedure(s) (LRB):  REPAIR, HERNIA, HIATAL, LAPAROSCOPIC (N/A)  FUNDOPLICATION, NISSEN, LAPAROSCOPIC (N/A)  EGD (ESOPHAGOGASTRODUODENOSCOPY) intraop (N/A)  REPAIR, HERNIA, EPIGASTRIC (N/A)    Patient location: PACU    Anesthesia Type: general    Transport from OR: Transported from OR on 6-10 L/min O2 by face mask with adequate spontaneous ventilation    Post pain: adequate analgesia    Post assessment: no apparent anesthetic complications and tolerated procedure well    Post vital signs: stable    Level of consciousness: awake and alert    Nausea/Vomiting: no nausea/vomiting    Complications: none    Transfer of care protocol was followed      Last vitals:   Visit Vitals  /63 (BP Location: Left arm, Patient Position: Lying)   Pulse 82   Temp 36.6 °C (97.9 °F) (Temporal)   Resp 18   Ht 5' 11" (1.803 m)   Wt 115.2 kg (253 lb 15.5 oz)   SpO2 96%   BMI 35.42 kg/m²     "

## 2020-02-04 NOTE — PROGRESS NOTES
Patient assigned to this writer by charge nurse. The patient is in the waiting room but, will be escorted to [facility] CORE D room 47. This writer is completing a chart review and reviewing MD orders.

## 2020-02-04 NOTE — ANESTHESIA PREPROCEDURE EVALUATION
02/04/2020  You Seals is a 44 y.o., male presenting for colonoscopy.    Past Medical History:   Diagnosis Date    Abdominal hernia     Hepatitis C 2018    Hiatal hernia 2018    High cholesterol 2018    History of kidney stones     Hypertension 2018     Past Surgical History:   Procedure Laterality Date    COLONOSCOPY N/A 8/20/2019    Procedure: COLONOSCOPY;  Surgeon: Marce Womack MD;  Location: [facility];  Service: Endoscopy;  Laterality: N/A;  Can not arrive before 8 am    ESOPHAGEAL MANOMETRY WITH MEASUREMENT OF IMPEDANCE N/A 1/21/2020    Procedure: MANOMETRY, ESOPHAGUS, WITH IMPEDANCE MEASUREMENT;  Surgeon: Thony Moscoso MD;  Location: [facility] (4TH FLR);  Service: Endoscopy;  Laterality: N/A;  original order placed by kameron sellers-on procedure day, decided to not have it done due to recent surgery. new order placed  1/14 - pt confirmed appt    EXTRACORPOREAL SHOCK WAVE LITHOTRIPSY      GASTROSCOPY      Inginal Hernia  1978    possible appendix removal at this time??    LAPAROSCOPIC SIGMOIDECTOMY N/A 11/4/2019    Procedure: COLECTOMY, SIGMOID, LAPAROSCOPIC, flex sig, ERAS low;  Surgeon: Loi Somers MD;  [facility];  Service: Colon and Rectal;  Laterality: N/A;     Review of patient's allergies indicates:   Allergen Reactions    Sulfa (sulfonamide antibiotics) Hives     Current Facility-Administered Medications on File Prior to Visit   Medication Dose Route Frequency Provider Last Rate Last Dose    0.9%  NaCl infusion   Intravenous Continuous Morris Miller Jr., MD        ceFAZolin injection 2 g  2 g Intravenous On Call Procedure Morris Miller Jr., MD        lidocaine (PF) 10 mg/ml (1%) injection 10 mg  1 mL Intradermal Once Morris Miller Jr., MD        lidocaine HCL 10 mg/ml (1%) injection 1 mL  1 mL Other Once Morris Miller Jr.  MD         Current Outpatient Medications on File Prior to Visit   Medication Sig Dispense Refill    ergocalciferol (ERGOCALCIFEROL) 50,000 unit Cap TAKE ONE CAPSULE EVERY WEEK FOR LOW VITAMIN D  5    hyoscyamine (ANASPAZ,LEVSIN) 0.125 mg Tab TAKE 1 TABLET (125 MCG TOTAL) BY MOUTH EVERY 4 (FOUR) HOURS AS NEEDED.  5    Lactobacillus rhamnosus GG (CULTURELLE) 10 billion cell capsule Take 1 capsule by mouth once daily.      lamoTRIgine (LAMICTAL) 25 MG tablet 2 (two) times daily.       metoprolol succinate (TOPROL-XL) 100 MG 24 hr tablet every evening.       metoprolol succinate (TOPROL-XL) 50 MG 24 hr tablet Take 50 mg by mouth once daily.  11    omeprazole (PRILOSEC) 20 MG capsule Take 20 mg by mouth once daily.  11    oxyCODONE (ROXICODONE) 5 MG immediate release tablet Take 1 tablet (5 mg total) by mouth every 4 (four) hours as needed. (Patient not taking: Reported on 11/19/2019) 30 tablet 0    traMADol (ULTRAM) 50 mg tablet Take 1 tablet (50 mg total) by mouth every 6 (six) hours as needed. (Patient not taking: Reported on 11/19/2019) 30 tablet 0         Pre-op Assessment    I have reviewed the Patient Summary Reports.     I have reviewed the Nursing Notes.   I have reviewed the Medications.     Review of Systems  Anesthesia Hx:  No problems with previous Anesthesia   Denies Personal Hx of Anesthesia complications.   Social:  Former Smoker, No Alcohol Use    Hematology/Oncology:  Hematology Normal   Oncology Normal     EENT/Dental:EENT/Dental Normal   Cardiovascular:   Exercise tolerance: good Hypertension ECG has been reviewed.    Pulmonary:  Pulmonary Normal    Renal/:   renal calculi    Hepatic/GI:   Hiatal Hernia, Hepatitis, C    Musculoskeletal:  Musculoskeletal Normal    Endocrine:  Endocrine Normal    Dermatological:  Skin Normal    Psych:  Psychiatric Normal           Physical Exam  General:  Obesity    Airway/Jaw/Neck:  Airway Findings: Mouth Opening: Normal Tongue: Normal  General Airway  Assessment: Adult  Mallampati: III  Improves to II with phonation.  TM Distance: Normal, at least 6 cm  Jaw/Neck Findings:  Neck ROM: Normal ROM      Dental:  Dental Findings: In tact        Mental Status:  Mental Status Findings:  Cooperative, Alert and Oriented         Anesthesia Plan  Type of Anesthesia, risks & benefits discussed:  Anesthesia Type:  general  Patient's Preference:   Intra-op Monitoring Plan: standard ASA monitors  Intra-op Monitoring Plan Comments:   Post Op Pain Control Plan: per primary service following discharge from PACU and multimodal analgesia  Post Op Pain Control Plan Comments:   Induction:   IV  Beta Blocker:  Patient is on a Beta-Blocker and has received one dose within the past 24 hours (No further documentation required).       Informed Consent: Patient understands risks and agrees with Anesthesia plan.  Questions answered. Anesthesia consent signed with patient.  ASA Score: 2     Day of Surgery Review of History & Physical:    H&P update referred to the surgeon.     Anesthesia Plan Notes: Low ERAS protocol        Ready For Surgery From Anesthesia Perspective.     Lab Results   Component Value Date    WBC 4.79 10/28/2019    HGB 13.8 (L) 10/28/2019    HCT 40.6 10/28/2019    MCV 91 10/28/2019     10/28/2019       BMP  Lab Results   Component Value Date     01/27/2020    K 4.4 01/27/2020     01/27/2020    CO2 27 01/27/2020    BUN 14 01/27/2020    CREATININE 1.0 01/27/2020    CALCIUM 9.2 01/27/2020    ANIONGAP 9 01/27/2020    ESTGFRAFRICA >60.0 01/27/2020    EGFRNONAA >60.0 01/27/2020

## 2020-02-04 NOTE — BRIEF OP NOTE
[facility]  Brief Operative Note    Surgery Date: 2/4/2020     Surgeon(s) and Role:     * Morris Miller Jr., MD - Primary     * John Bella MD - Resident - Assisting        Pre-op Diagnosis:  Hiatal hernia with GERD [K21.9, K44.9]    Post-op Diagnosis:  Post-Op Diagnosis Codes:     * Hiatal hernia with GERD [K21.9, K44.9]    Procedure(s) (LRB):  REPAIR, HERNIA, HIATAL, LAPAROSCOPIC (N/A)  FUNDOPLICATION, NISSEN, LAPAROSCOPIC (N/A)  EGD (ESOPHAGOGASTRODUODENOSCOPY) intraop (N/A)  REPAIR, HERNIA, EPIGASTRIC (N/A)    Anesthesia: General    Description of the findings of the procedure(s): Laparoscopic hiatal hernia repair of Type III hiatal hernia. Primary Laparoscopic repair of epigastric hernia    No apparent complication. Intra Op EGD wnl    Estimated Blood Loss: 25cc         Specimens:   Specimen (12h ago, onward)    None

## 2020-02-04 NOTE — ANESTHESIA PROCEDURE NOTES
Intubation  Performed by: Yudelka Poole MD  Authorized by: Yudelka Poole MD     Intubation:     Induction:  Intravenous    Intubated:  Postinduction    Mask Ventilation:  Easy mask    Attempts:  1    Attempted By:  Staff anesthesiologist    Method of Intubation:  Direct    Blade:  Pavel 3    Laryngeal View Grade: Grade I - full view of chords      Difficult Airway Encountered?: No      Complications:  None    Airway Device:  Oral endotracheal tube    Airway Device Size:  8.0    Style/Cuff Inflation:  Cuffed    Inflation Amount (mL):  10    Tube secured:  24    Secured at:  The teeth    Placement Verified By:  Capnometry    Complicating Factors:  None    Findings Post-Intubation:  BS equal bilateral

## 2020-02-05 VITALS
BODY MASS INDEX: 35.56 KG/M2 | WEIGHT: 254 LBS | RESPIRATION RATE: 14 BRPM | DIASTOLIC BLOOD PRESSURE: 81 MMHG | TEMPERATURE: 98 F | HEART RATE: 88 BPM | SYSTOLIC BLOOD PRESSURE: 134 MMHG | OXYGEN SATURATION: 95 % | HEIGHT: 71 IN

## 2020-02-05 LAB
ANION GAP SERPL CALC-SCNC: 11 MMOL/L (ref 8–16)
BASOPHILS # BLD AUTO: 0.01 K/UL (ref 0–0.2)
BASOPHILS NFR BLD: 0.1 % (ref 0–1.9)
BUN SERPL-MCNC: 15 MG/DL (ref 6–20)
CALCIUM SERPL-MCNC: 8.9 MG/DL (ref 8.7–10.5)
CHLORIDE SERPL-SCNC: 104 MMOL/L (ref 95–110)
CO2 SERPL-SCNC: 24 MMOL/L (ref 23–29)
CREAT SERPL-MCNC: 1 MG/DL (ref 0.5–1.4)
DIFFERENTIAL METHOD: ABNORMAL
EOSINOPHIL # BLD AUTO: 0 K/UL (ref 0–0.5)
EOSINOPHIL NFR BLD: 0 % (ref 0–8)
ERYTHROCYTE [DISTWIDTH] IN BLOOD BY AUTOMATED COUNT: 14.1 % (ref 11.5–14.5)
EST. GFR  (AFRICAN AMERICAN): >60 ML/MIN/1.73 M^2
EST. GFR  (NON AFRICAN AMERICAN): >60 ML/MIN/1.73 M^2
GLUCOSE SERPL-MCNC: 131 MG/DL (ref 70–110)
HCT VFR BLD AUTO: 40.4 % (ref 40–54)
HGB BLD-MCNC: 13.1 G/DL (ref 14–18)
IMM GRANULOCYTES # BLD AUTO: 0.04 K/UL (ref 0–0.04)
IMM GRANULOCYTES NFR BLD AUTO: 0.5 % (ref 0–0.5)
LYMPHOCYTES # BLD AUTO: 1.5 K/UL (ref 1–4.8)
LYMPHOCYTES NFR BLD: 16.9 % (ref 18–48)
MAGNESIUM SERPL-MCNC: 2 MG/DL (ref 1.6–2.6)
MCH RBC QN AUTO: 30.5 PG (ref 27–31)
MCHC RBC AUTO-ENTMCNC: 32.4 G/DL (ref 32–36)
MCV RBC AUTO: 94 FL (ref 82–98)
MONOCYTES # BLD AUTO: 0.7 K/UL (ref 0.3–1)
MONOCYTES NFR BLD: 7.9 % (ref 4–15)
NEUTROPHILS # BLD AUTO: 6.5 K/UL (ref 1.8–7.7)
NEUTROPHILS NFR BLD: 74.6 % (ref 38–73)
NRBC BLD-RTO: 0 /100 WBC
PHOSPHATE SERPL-MCNC: 3.2 MG/DL (ref 2.7–4.5)
PLATELET # BLD AUTO: 283 K/UL (ref 150–350)
PMV BLD AUTO: 9.6 FL (ref 9.2–12.9)
POTASSIUM SERPL-SCNC: 4.8 MMOL/L (ref 3.5–5.1)
RBC # BLD AUTO: 4.3 M/UL (ref 4.6–6.2)
SODIUM SERPL-SCNC: 139 MMOL/L (ref 136–145)
WBC # BLD AUTO: 8.65 K/UL (ref 3.9–12.7)

## 2020-02-05 PROCEDURE — 25000003 PHARM REV CODE 250: Performed by: STUDENT IN AN ORGANIZED HEALTH CARE EDUCATION/TRAINING PROGRAM

## 2020-02-05 PROCEDURE — 36415 COLL VENOUS BLD VENIPUNCTURE: CPT

## 2020-02-05 PROCEDURE — 84100 ASSAY OF PHOSPHORUS: CPT

## 2020-02-05 PROCEDURE — 97802 MEDICAL NUTRITION INDIV IN: CPT

## 2020-02-05 PROCEDURE — 80048 BASIC METABOLIC PNL TOTAL CA: CPT

## 2020-02-05 PROCEDURE — C9113 INJ PANTOPRAZOLE SODIUM, VIA: HCPCS | Performed by: STUDENT IN AN ORGANIZED HEALTH CARE EDUCATION/TRAINING PROGRAM

## 2020-02-05 PROCEDURE — 85025 COMPLETE CBC W/AUTO DIFF WBC: CPT

## 2020-02-05 PROCEDURE — 63600175 PHARM REV CODE 636 W HCPCS: Performed by: STUDENT IN AN ORGANIZED HEALTH CARE EDUCATION/TRAINING PROGRAM

## 2020-02-05 PROCEDURE — 83735 ASSAY OF MAGNESIUM: CPT

## 2020-02-05 RX ORDER — PANTOPRAZOLE SODIUM 40 MG/1
40 TABLET, DELAYED RELEASE ORAL 2 TIMES DAILY
Qty: 60 TABLET | Refills: 11 | Status: SHIPPED | OUTPATIENT
Start: 2020-02-05 | End: 2021-02-04

## 2020-02-05 RX ORDER — HYDROCODONE BITARTRATE AND ACETAMINOPHEN 7.5; 325 MG/15ML; MG/15ML
15 SOLUTION ORAL EVERY 4 HOURS PRN
Qty: 118 ML | Refills: 0 | Status: SHIPPED | OUTPATIENT
Start: 2020-02-05 | End: 2020-03-09 | Stop reason: ALTCHOICE

## 2020-02-05 RX ORDER — METOPROLOL TARTRATE 25 MG/1
25 TABLET, FILM COATED ORAL 2 TIMES DAILY
Qty: 60 TABLET | Refills: 1 | Status: SHIPPED | OUTPATIENT
Start: 2020-02-05 | End: 2020-04-05

## 2020-02-05 RX ORDER — ONDANSETRON 8 MG/1
8 TABLET, ORALLY DISINTEGRATING ORAL EVERY 6 HOURS PRN
Status: DISCONTINUED | OUTPATIENT
Start: 2020-02-05 | End: 2020-02-05 | Stop reason: HOSPADM

## 2020-02-05 RX ORDER — METOPROLOL TARTRATE 25 MG/1
25 TABLET, FILM COATED ORAL 2 TIMES DAILY
Status: DISCONTINUED | OUTPATIENT
Start: 2020-02-05 | End: 2020-02-05 | Stop reason: HOSPADM

## 2020-02-05 RX ORDER — ONDANSETRON 8 MG/1
8 TABLET, ORALLY DISINTEGRATING ORAL EVERY 6 HOURS PRN
Qty: 30 TABLET | Refills: 1 | Status: SHIPPED | OUTPATIENT
Start: 2020-02-05

## 2020-02-05 RX ADMIN — LAMOTRIGINE 25 MG: 25 TABLET ORAL at 08:02

## 2020-02-05 RX ADMIN — PANTOPRAZOLE SODIUM 40 MG: 40 INJECTION, POWDER, FOR SOLUTION INTRAVENOUS at 08:02

## 2020-02-05 RX ADMIN — METOPROLOL TARTRATE 25 MG: 25 TABLET ORAL at 08:02

## 2020-02-05 NOTE — SUBJECTIVE & OBJECTIVE
Interval History:   POD1. Doing very well. No N/V overnight. Abdominal pain controlled.     Medications:  Continuous Infusions:  Scheduled Meds:   lamoTRIgine  25 mg Oral BID    metoprolol tartrate  25 mg Oral BID    pantoprazole  40 mg Intravenous BID     PRN Meds:hydrocodone-apap 7.5-325 MG/15 ML, naloxone, ondansetron, promethazine (PHENERGAN) IVPB     Review of patient's allergies indicates:   Allergen Reactions    Sulfa (sulfonamide antibiotics) Hives     Objective:     Vital Signs (Most Recent):  Temp: 97.4 °F (36.3 °C) (02/05/20 0831)  Pulse: 87 (02/05/20 0831)  Resp: 18 (02/05/20 0831)  BP: (!) 134/91 (02/05/20 0831)  SpO2: 95 % (02/05/20 0831) Vital Signs (24h Range):  Temp:  [97 °F (36.1 °C)-98.5 °F (36.9 °C)] 97.4 °F (36.3 °C)  Pulse:  [70-87] 87  Resp:  [16-22] 18  SpO2:  [90 %-96 %] 95 %  BP: (110-154)/(63-92) 134/91     Weight: 115.2 kg (253 lb 15.5 oz)  Body mass index is 35.42 kg/m².    Intake/Output - Last 3 Shifts       02/03 0700 - 02/04 0659 02/04 0700 - 02/05 0659 02/05 0700 - 02/06 0659    I.V. (mL/kg)  2000 (17.4)     Total Intake(mL/kg)  2000 (17.4)     Urine (mL/kg/hr)  700     Blood  15     Total Output  715     Net  +1285            Urine Occurrence  2 x           Physical Exam  A&O, NAD  RRR  No Resp Distress, SARAH BETH  ABD: obese, soft, AppTTP, incisions c/d/i, no rebound or guarding    Significant Labs:  CBC:   Recent Labs   Lab 02/05/20 0411   WBC 8.65   RBC 4.30*   HGB 13.1*   HCT 40.4      MCV 94   MCH 30.5   MCHC 32.4     CMP:   Recent Labs   Lab 02/05/20 0411   *   CALCIUM 8.9      K 4.8   CO2 24      BUN 15   CREATININE 1.0       Significant Diagnostics:  I have reviewed all pertinent imaging results/findings within the past 24 hours.

## 2020-02-05 NOTE — PLAN OF CARE
Pt is AAOX4,VSS. Pt is progressing towards plan of care goals. Pain management has been assessed. PCA pump in place, pt comfortable. SCDs in place with frequent checks for skin breakdown. Fall precautions in place, no report of falls. Safety measures are in place bed in lowest position, wheels locked, call light within reach. Will continue to monitor.

## 2020-02-05 NOTE — NURSING TRANSFER
Nursing Transfer Note      2/4/2020     Transfer To: 553B    Transfer via bed    Transfer with iv pole    Transported by pct x 2     Medicines sent: none    Chart send with patient: Yes    Notified: spouse @ bedside     Patient reassessed at: 2/4/2020  1803

## 2020-02-05 NOTE — CONSULTS
Food & Nutrition  Education    Diet Education: Nissen  Learners Pt       Nutrition Education provided with handouts: Diet After Gastric Surgery       Comments: POD1 Lap Hiatal Hernia repair, Nissen, and Epigastric hernia repair. Pt reports receiving diet handout pre-op. Educated pt on Full Liquid diet x 1 week and Soft diet x 1 week. Discussed preventing stomach stretching and excess gas. Pt verbalized understanding. Pt verbalized understanding. Noted wt gain per chart review. Pt appears nourished with no signs of wasting.       All questions and concerns answered.       Follow-Up: Yes     Please re-consult as needed.

## 2020-02-05 NOTE — ANESTHESIA POSTPROCEDURE EVALUATION
Anesthesia Post Evaluation    Patient: You Seals    Procedure(s) Performed: Procedure(s) (LRB):  REPAIR, HERNIA, HIATAL, LAPAROSCOPIC (N/A)  FUNDOPLICATION, NISSEN, LAPAROSCOPIC (N/A)  EGD (ESOPHAGOGASTRODUODENOSCOPY) intraop (N/A)  REPAIR, HERNIA, EPIGASTRIC (N/A)    Final Anesthesia Type: general    Patient location during evaluation: PACU  Patient participation: Yes- Able to Participate  Level of consciousness: awake and alert and oriented  Post-procedure vital signs: reviewed and stable  Pain management: adequate  Airway patency: patent  CESAR mitigation strategies: Intraoperative administration of CPAP, nasopharyngeal airway, or oral appliance during sedation, Multimodal analgesia, Extubation while patient is awake, Verification of full reversal of neuromuscular block and Extubation and recovery carried out in lateral, semiupright, or other nonsupine position  PONV status at discharge: No PONV  Anesthetic complications: no      Cardiovascular status: hemodynamically stable  Respiratory status: unassisted  Hydration status: euvolemic  Follow-up not needed.          Vitals Value Taken Time   /91 2/5/2020  8:31 AM   Temp 36.3 °C (97.4 °F) 2/5/2020  8:31 AM   Pulse 87 2/5/2020  8:31 AM   Resp 18 2/5/2020  8:31 AM   SpO2 95 % 2/5/2020  8:31 AM         Event Time     Out of Recovery 18:00:00          Pain/Benita Score: Pain Rating Prior to Med Admin: 0 (2/4/2020  5:29 PM)  Benita Score: 9 (2/4/2020  6:15 PM)

## 2020-02-05 NOTE — OP NOTE
DATE OF PROCEDURE: 02/04/2020   SERVICE: Bariatric Surgery.   Surgeon(s) and Role:     * Morris Miller Jr., MD - Primary     * John Bella MD - Resident - Assisting  PREOPERATIVE DIAGNOSES: Type III Incarcerated Hiatal Hernia and Epigastric Hernia.  POSTOPERATIVE DIAGNOSES: Type III Incarcerated Hiatal Hernia and Epigastric Hernia.  PROCEDURE: Laparoscopic repair of hiatal hernia with mesh and  Nissen fundoplication and EGD.  Laparoscopic Epigastric hernia repair.  ANESTHESIA: General endotracheal and local.   DESCRIPTION OF PROCEDURE: The patient was taken to the Operating Room and   placed under general anesthesia and prepped and draped in sterile fashion. At   this time, an incision was made 15 cm from the xiphoid, 5 cm to the left of   midline after infiltrating with local anesthetic. Using the Optiview trocar,   intraabdominal access was obtained under direct visualization without   difficulty. Pneumoperitoneum was obtained. Further ports were then placed   including bilateral anterior axillary subcostal 5-mm ports, a midclavicular   subcostal port on the right. These were all 5-mm ports and a second 12-mm port   approximately 15 cm from the xiphoid just to the right of midline. These were   all placed under direct visualization after infiltrating with local anesthetic.  A liver retractor was placed and the patient was placed in a steep reverse Trendelenburg and we began dissection. We began at the pars flaccida and opened the to the right mesfin.    We dissected the right mesfin and continued anterior dissection including the phrenoesophageal ligament with Harmonic scalpel and dissected anteriorly around the mesfin and laterally as well on the left side.  The right sided dissection was very difficult.  There was a significant amount of fat and stomach incarcerated in the defect.  We proceeded for over an hour to get this right side down.  We were able to complete this reduction after some significantly  increased time.  In order to get the left side down and for formation of our wrap we proceeded to take down the attatchments on the greater curve of the stomach including the short gastric vessels for some distance down the curve.  We dissected the hiatal opening posteriorly again with Harmonic scalpel. We proceeded to carefully dissect this until the stomach was completely reduced. We completed circumferential dissection and placed a vesi loop at the GE junction.  We used this for retraction and continued dissection on the mediastinum to allow for further intra abdominal esophageal length.  Once we completed reduction of the hernia and noted that there was significant   intraabdominal length of the esophagus with no tension pulling this into the   abdominal cavity and approximately 3 cm of   intra-abdominal esophagus. Once we noted that we had good intraabdominal   esophagus, we proceeded with the closure of the hiatal hernia. This was done   with 2-0 Surgidac suture on an EndoStitch device and with Bio A pledgets and   closing the posterior aspect with 2 sutures. At this time, a 56-Malagasy bougie   was placed and it was noted to be closed around the bougie without difficulty.   There was enough room for a grasper but it was not noted to be tight. Once this was closed, we proceeded with placement of the mesh. A piece of BIO-A   hiatal hernia mesh was placed retro-esophageally after being cut to size and was   tacked in place using 2-0 Surgidac suture x 3 on the mesfin on either side and   posteriorly to cover the hiatal closure. Once we completed closure of the   hiatus and placement of mesh along with reduction of the hernia   Once   this was closed we proceeded with a Nissen fundoplication. At this time, the fundus was brought back posteriorly retro-esophageally and around the 56-Malagasy bougie, we wrapped the stomach. At this time, we took fundus on the left side to esophagus to stomach on the right side to fundus.  Bites x3 around the 56-Albanian bougie to complete   the Nissen fundoplication ensuing to avoid vagus nerve during this time. Once   we completed, the wrap appeared to be in very good condition and we proceeded   with an EGD. The scope was placed in the oropharynx, got down into the   esophagus into the stomach through the wrap. We noted that the GE   junction was able to be easily traversed without significant tightness around   the scope. A retroflexion view showed a good wrap in good condition. Once this   was done, we suctioned the air from the stomach and reinspected   laparoscopically. The wrap was in good condition from both the EGD and   laparoscopic view.  We then turned to repair of the epigastric hernia.  The defect was noted to be under the falciform ligament.  This was taken down with harmonic scalpel.  We exposed the defect and reduced a significant amount of pre peritoneal fat.  Once this was completely reduced we infiltrated local over the defect.  We made a small stab incision over the defect.  The defect was noted to be about 0.5cm.  Using a suture passer and 0 prolene suture the defect was closed in figure of 8 fashion.  Once the suture was tied down it was noted to be well closed.  We then proceeded with closure. At this time, the liver   retractor was removed.  The ports were removed and the skin of all five port sites were closed with 4-0 Monocryl suture in a subcuticular fashion. Mastisol and Steri-Strips were   Placed, including over the stab incision. The patient was allowed to awake from general anesthesia and   transferred to bed for transfer to Recovery.   COMPLICATIONS: None.   SPONGE COUNT: Correct.   BLOOD LOSS: 15 mL.   FLUIDS: Per Anesthesia.   BLOOD GIVEN: None.   DRAINS: None.   SPECIMENS: None  CONDITION OF PATIENT: Good.   I was present for the entire procedure.

## 2020-02-05 NOTE — DISCHARGE INSTRUCTIONS
Crush all medications and open all capsules while on the post Nissen diet. Avoid medications that are extended release as these cannot be crushed

## 2020-02-05 NOTE — PROGRESS NOTES
[facility]  General Surgery  Progress Note    Subjective:     History of Present Illness:  No notes on file    Post-Op Info:  Procedure(s) (LRB):  REPAIR, HERNIA, HIATAL, LAPAROSCOPIC (N/A)  FUNDOPLICATION, NISSEN, LAPAROSCOPIC (N/A)  EGD (ESOPHAGOGASTRODUODENOSCOPY) intraop (N/A)  REPAIR, HERNIA, EPIGASTRIC (N/A)   1 Day Post-Op     Interval History:   POD1. Doing very well. No N/V overnight. Abdominal pain controlled.     Medications:  Continuous Infusions:  Scheduled Meds:   lamoTRIgine  25 mg Oral BID    metoprolol tartrate  25 mg Oral BID    pantoprazole  40 mg Intravenous BID     PRN Meds:hydrocodone-apap 7.5-325 MG/15 ML, naloxone, ondansetron, promethazine (PHENERGAN) IVPB     Review of patient's allergies indicates:   Allergen Reactions    Sulfa (sulfonamide antibiotics) Hives     Objective:     Vital Signs (Most Recent):  Temp: 97.4 °F (36.3 °C) (02/05/20 0831)  Pulse: 87 (02/05/20 0831)  Resp: 18 (02/05/20 0831)  BP: (!) 134/91 (02/05/20 0831)  SpO2: 95 % (02/05/20 0831) Vital Signs (24h Range):  Temp:  [97 °F (36.1 °C)-98.5 °F (36.9 °C)] 97.4 °F (36.3 °C)  Pulse:  [70-87] 87  Resp:  [16-22] 18  SpO2:  [90 %-96 %] 95 %  BP: (110-154)/(63-92) 134/91     Weight: 115.2 kg (253 lb 15.5 oz)  Body mass index is 35.42 kg/m².    Intake/Output - Last 3 Shifts       02/03 0700 - 02/04 0659 02/04 0700 - 02/05 0659 02/05 0700 - 02/06 0659    I.V. (mL/kg)  2000 (17.4)     Total Intake(mL/kg)  2000 (17.4)     Urine (mL/kg/hr)  700     Blood  15     Total Output  715     Net  +1285            Urine Occurrence  2 x           Physical Exam  A&O, NAD  RRR  No Resp Distress, SARAH BETH  ABD: obese, soft, AppTTP, incisions c/d/i, no rebound or guarding    Significant Labs:  CBC:   Recent Labs   Lab 02/05/20 0411   WBC 8.65   RBC 4.30*   HGB 13.1*   HCT 40.4      MCV 94   MCH 30.5   MCHC 32.4     CMP:   Recent Labs   Lab 02/05/20 0411   *   CALCIUM 8.9      K 4.8   CO2 24      BUN  15   CREATININE 1.0       Significant Diagnostics:  I have reviewed all pertinent imaging results/findings within the past 24 hours.    Assessment/Plan:     * Hiatal hernia  1 Day Post-Op Lap Hiatal Hernia repair, Nissen, and Epigastric hernia repair    - CLD this morning, ADAT to post nissen FLD if tolerating  - nutrition consult for post nissen diet  - d/c PCA, transition to Hycet  - PRN Antiemetics  - PPI  - ppx Lovenox  - IS, wean off NC  - OOB  - Home Lamictal and Metoprolol   - All meds crushed    DISPO: possibly home later today           John Bella MD  General Surgery  [facility]

## 2020-02-05 NOTE — ASSESSMENT & PLAN NOTE
1 Day Post-Op Lap Hiatal Hernia repair, Nissen, and Epigastric hernia repair    - CLD this morning, ADAT to post nissen FLD if tolerating  - nutrition consult for post nissen diet  - d/c PCA, transition to Hycet  - PRN Antiemetics  - PPI  - ppx Lovenox  - IS, wean off NC  - OOB  - Home Lamictal and Metoprolol   - All meds crushed    DISPO: possibly home later today

## 2020-02-06 NOTE — DISCHARGE SUMMARY
[facility]  General Surgery  Discharge Summary      Patient Name: You Seals  MRN: [id number]  Admission Date: 2/4/2020  Hospital Length of Stay: 0 days  Discharge Date and Time: 2/5/2020  1:20 PM  Attending Physician: Sherine att. providers found   Discharging Provider: John Bella MD  Primary Care Provider: Marisa French MD     HPI:   Chief complaint: evaluation of hiatal and 'epigastric' hernia     History of Present Illness:  Patient is a 44 y.o. male w/ a PMHx of Hep C and diverticulosis who presents to the clinic for evaluation of an abdominal bulge and a hiatal hernia.   Pt is relatively symptom free currently and only endorses intermittent heartburn but claims that in the past year he has had to reduce his portion size due to regurgitation of solid food.   He denies dysphagia, globus. He had some form of imaging a year ago in [place], [place] that showed that his stomach was in his thorax.      His second issue is a chronic epigastric bulge that he has had since he was a child.   He complains of constant dull, aching pain that prevents him from getting a restful night's sleep.   He denies N/V. He is chronically diarrheal and is seen by a GI physician for diverticulosis.      Interval: Pt underwent a lap sigmoid resection for his diverticulitis.  His workup was alos completed     UGI: Large HH  Manometry: WNL.  CT: Epigastric hernia and Hital hernia       Procedure(s) (LRB):  REPAIR, HERNIA, HIATAL, LAPAROSCOPIC (N/A)  FUNDOPLICATION, NISSEN, LAPAROSCOPIC (N/A)  EGD (ESOPHAGOGASTRODUODENOSCOPY) intraop (N/A)  REPAIR, HERNIA, EPIGASTRIC (N/A)     Hospital Course: Patient presented to [facility] with Hiatal hernia and Epigastric hernia. He underwent: Procedure(s) (LRB):  REPAIR, HERNIA, HIATAL, LAPAROSCOPIC (N/A)  FUNDOPLICATION, NISSEN, LAPAROSCOPIC (N/A)  EGD (ESOPHAGOGASTRODUODENOSCOPY) intraop (N/A)  REPAIR, HERNIA, EPIGASTRIC (N/A). He tolerated the procedure well and was transferred to the  floor after recovery from anesthesia. Please see the operative note for further procedure details. Vitals remained stable, and he was afebrile all throughout the post operative period. Labs were reviewed and electrolytes were replaced appropriately. Physical exam was appropriate for post operative state.  Diet was advanced, and he was able to tolerate a post nissen prior to discharge. he was ambulating without difficulty . Patient was deemed suitable for discharge on POD 1. He was discharged in good condition with medications and instructions as below. He voiced understanding of the instructions prior to discharge.     For more thorough information, please refer to the hospital record and operative report.      Consults:   Consults (From admission, onward)        Status Ordering Provider     Inpatient consult to Registered Dietitian/Nutritionist  Once     Provider:  (Not yet assigned)    Completed JAYA COFFEY          Significant Diagnostic Studies: Labs:   BMP:   Recent Labs   Lab 02/05/20  0411   *      K 4.8      CO2 24   BUN 15   CREATININE 1.0   CALCIUM 8.9   MG 2.0    and CBC   Recent Labs   Lab 02/05/20 0411   WBC 8.65   HGB 13.1*   HCT 40.4          Pending Diagnostic Studies:     None        Final Active Diagnoses:    Diagnosis Date Noted POA    PRINCIPAL PROBLEM:  Hiatal hernia [K44.9] 01/21/2020 Yes      Problems Resolved During this Admission:      Discharged Condition: good    Disposition: Home or Self Care    Follow Up:  Follow-up Information     Morris Miller Jr, MD. Schedule an appointment as soon as possible for a visit in 2 weeks.    Specialties:  General Surgery, Bariatrics  Why:  For wound re-check  Contact information:  [address] [facility] [address]  [phone]                 Patient Instructions:      Call MD for:  temperature >100.4     Call MD for:  persistent nausea and vomiting or diarrhea     Call MD for:  severe uncontrolled pain     Call  MD for:  difficulty breathing or increased cough     Lifting restrictions   Order Comments: Nothing over 10Lbs for the next 6 weeks. Avoid lifting or straining     No dressing needed   Order Comments: No dressing required. May shower with soap and water. Dab dry. Do not scrub vigorously. Do not submerge incisions for 2 weeks. Steri strips will fall off on their own over the next 2 weeks     Activity as tolerated     Shower on day dressing removed (No bath)   Order Comments: You can start showering 48 hours after surgery, avoid soaking in tub or pool for at least 2 weeks     Medications:  Reconciled Home Medications:      Medication List      START taking these medications    hydrocodone-apap 7.5-325 MG/15 ML oral solution  Commonly known as:  HYCET  Take 15 mLs by mouth every 4 (four) hours as needed.     metoprolol tartrate 25 MG tablet  Commonly known as:  LOPRESSOR  Take 1 tablet (25 mg total) by mouth 2 (two) times daily.     ondansetron 8 MG Tbdl  Commonly known as:  ZOFRAN-ODT  Dissolve 1 tablet (8 mg total) by mouth every 6 (six) hours as needed (for nausea).     pantoprazole 40 MG tablet  Commonly known as:  PROTONIX  Take 1 tablet (40 mg total) by mouth 2 (two) times daily.        CONTINUE taking these medications    hyoscyamine 0.125 mg Tab  Commonly known as:  ANASPAZ,LEVSIN  TAKE 1 TABLET (125 MCG TOTAL) BY MOUTH EVERY 4 (FOUR) HOURS AS NEEDED.     Lactobacillus rhamnosus GG 10 billion cell capsule  Commonly known as:  CULTURELLE  Take 1 capsule by mouth once daily.     lamoTRIgine 25 MG tablet  Commonly known as:  LAMICTAL  2 (two) times daily.        STOP taking these medications    ergocalciferol 50,000 unit Cap  Commonly known as:  ERGOCALCIFEROL     metoprolol succinate 100 MG 24 hr tablet  Commonly known as:  TOPROL-XL     metoprolol succinate 50 MG 24 hr tablet  Commonly known as:  TOPROL-XL     omeprazole 20 MG capsule  Commonly known as:  PRILOSEC     oxyCODONE 5 MG immediate release  tablet  Commonly known as:  ROXICODONE     traMADol 50 mg tablet  Commonly known as:  ANA Bella MD  General Surgery  [facility]

## 2020-02-06 NOTE — PLAN OF CARE
Marisa French MD     [id number] - [place] - [address]  [address] [place] [address]  Phone: [phone] Fax: [phone]  Payor: MEDICAID / Plan: [facility] COMMUNITY PLAN [facility] ([place] MEDICAID) / Product Type: Managed Medicaid /       02/06/20 1359   Final Note   Assessment Type Final Discharge Note   Anticipated Discharge Disposition Home   What phone number can be called within the next 1-3 days to see how you are doing after discharge? [phone]   Hospital Follow Up  Appt(s) scheduled? Yes   Discharge plans and expectations educations in teach back method with documentation complete? Yes   Right Care Referral Info   Post Acute Recommendation No Care

## 2020-03-09 ENCOUNTER — OFFICE VISIT (OUTPATIENT)
Dept: SURGERY | Facility: CLINIC | Age: 45
End: 2020-03-09
Payer: MEDICAID

## 2020-03-09 VITALS
HEIGHT: 71 IN | WEIGHT: 253 LBS | DIASTOLIC BLOOD PRESSURE: 87 MMHG | HEART RATE: 76 BPM | BODY MASS INDEX: 35.42 KG/M2 | SYSTOLIC BLOOD PRESSURE: 118 MMHG

## 2020-03-09 DIAGNOSIS — Z09 POSTOP CHECK: Primary | ICD-10-CM

## 2020-03-09 PROCEDURE — 99999 PR PBB SHADOW E&M-EST. PATIENT-LVL III: CPT | Mod: PBBFAC,,, | Performed by: SURGERY

## 2020-03-09 PROCEDURE — 99999 PR PBB SHADOW E&M-EST. PATIENT-LVL III: ICD-10-PCS | Mod: PBBFAC,,, | Performed by: SURGERY

## 2020-03-09 PROCEDURE — 99024 POSTOP FOLLOW-UP VISIT: CPT | Mod: ,,, | Performed by: SURGERY

## 2020-03-09 PROCEDURE — 99024 PR POST-OP FOLLOW-UP VISIT: ICD-10-PCS | Mod: ,,, | Performed by: SURGERY

## 2020-03-09 PROCEDURE — 99213 OFFICE O/P EST LOW 20 MIN: CPT | Mod: PBBFAC | Performed by: SURGERY

## 2020-03-09 RX ORDER — METOPROLOL SUCCINATE 100 MG/1
TABLET, EXTENDED RELEASE ORAL
COMMUNITY
Start: 2020-03-02

## 2020-03-09 RX ORDER — LAMOTRIGINE 100 MG/1
TABLET ORAL
COMMUNITY
Start: 2020-01-29

## 2020-03-09 NOTE — PROGRESS NOTES
[facility] - General Surgery  General Surgery  Progress Note    Patient Name: You Seals  MRN: [id number]    Subjective:     History of Present Illness:  Patient is a 44 y.o. male presenting for a post op visit from laparoscopic type III hiatal hernia repair with mesh, nissen fundoplication and EGD, and laparoscopic repair of an epigastric hernia on 2/4/2020. Reports that since surgery, he has been feeling well. Reports he feels he has had no complications. Feels he has relief of the epigastric pain he was having and denies reflux symptoms. He is no longer taking protonix or zofran. He works as a , but tells me he has been lifting poles that are 50 lbs.     ROS  Gen: no fevers, no chills, no recent weight loss  CV: no palpitations, no peripheral edema  Respit: no cough, no SOB  Abd: no abd pain, no nausea, no vomiting.  Skin: no rash, no wound  MSK: no back pain, no arthralgias, no myalgias  Endo: no excessive hunger or thirst  Heme: no lymphadenopathy  Neuro: no headache, no dizziness  Psych: no depression, no anxiety    Medical History    Current Outpatient Medications:     lamoTRIgine (LAMICTAL) 100 MG tablet, TAKE 1/2 TAB BID X2 WEEKS, 1 TAB IN THE AM AND 1/2 TAB IN THE PM X2 WEEKS, THEN 1 TAB BID, Disp: , Rfl:     lamoTRIgine (LAMICTAL) 25 MG tablet, 2 (two) times daily. , Disp: , Rfl:     metoprolol succinate (TOPROL-XL) 100 MG 24 hr tablet, , Disp: , Rfl:     hyoscyamine (ANASPAZ,LEVSIN) 0.125 mg Tab, TAKE 1 TABLET (125 MCG TOTAL) BY MOUTH EVERY 4 (FOUR) HOURS AS NEEDED., Disp: , Rfl: 5    Lactobacillus rhamnosus GG (CULTURELLE) 10 billion cell capsule, Take 1 capsule by mouth once daily., Disp: , Rfl:     metoprolol parson-hydrochlorothiaz 100-12.5 mg Tb24, , Disp: , Rfl:     metoprolol tartrate (LOPRESSOR) 25 MG tablet, Take 1 tablet (25 mg total) by mouth 2 (two) times daily., Disp: 60 tablet, Rfl: 1    ondansetron (ZOFRAN-ODT) 8 MG TbDL, Dissolve 1 tablet (8 mg total) by mouth every 6 (six) hours  as needed (for nausea)., Disp: 30 tablet, Rfl: 1    pantoprazole (PROTONIX) 40 MG tablet, Take 1 tablet (40 mg total) by mouth 2 (two) times daily., Disp: 60 tablet, Rfl: 11    Review of patient's allergies indicates:   Allergen Reactions    Sulfa (sulfonamide antibiotics) Hives       Past Medical History:   Diagnosis Date    Abdominal hernia     Hepatitis C 2018    Hiatal hernia 2018    High cholesterol 2018    History of kidney stones     Hypertension 2018     Past Surgical History:   Procedure Laterality Date    COLONOSCOPY N/A 8/20/2019    Procedure: COLONOSCOPY;  Surgeon: Marce Womack MD;  [facility];  Service: Endoscopy;  Laterality: N/A;  Can not arrive before 8 am    ESOPHAGEAL MANOMETRY WITH MEASUREMENT OF IMPEDANCE N/A 1/21/2020    Procedure: MANOMETRY, ESOPHAGUS, WITH IMPEDANCE MEASUREMENT;  Surgeon: Thony Moscoso MD;  Location: [facility] (4TH FLR);  Service: Endoscopy;  Laterality: N/A;  original order placed by kameron sellers-on procedure day, decided to not have it done due to recent surgery. new order placed  1/14 - pt confirmed appt    ESOPHAGOGASTRODUODENOSCOPY N/A 2/4/2020    Procedure: EGD (ESOPHAGOGASTRODUODENOSCOPY) intraop;  Surgeon: Morris Miller Jr., MD;  Location: [address];  Service: General;  Laterality: N/A;    EXTRACORPOREAL SHOCK WAVE LITHOTRIPSY      GASTROSCOPY      Inginal Hernia  1978    possible appendix removal at this time??    LAPAROSCOPIC NISSEN FUNDOPLICATION N/A 2/4/2020    Procedure: FUNDOPLICATION, NISSEN, LAPAROSCOPIC;  Surgeon: Morris Miller Jr., MD;  [address];  Service: General;  Laterality: N/A;    LAPAROSCOPIC SIGMOIDECTOMY N/A 11/4/2019    Procedure: COLECTOMY, SIGMOID, LAPAROSCOPIC, flex sig, ERAS low;  Surgeon: Loi Somers MD;  Location: [facility] OR [address] [place] [address];  Service: Colon and Rectal;  Laterality: N/A;    REPAIR OF EPIGASTRIC HERNIA N/A 2/4/2020    Procedure: REPAIR, HERNIA, EPIGASTRIC;  Surgeon:  Morris Miller Jr., MD;  Location: [facility] OR [address] [place] [address];  Service: General;  Laterality: N/A;     Family History     Problem Relation (Age of Onset)    Diabetes Mother    Fibromyalgia Mother    Heart failure Mother, Maternal Grandmother    Hypertension Mother    Stroke Paternal Grandmother, Paternal Grandfather        Tobacco Use    Smoking status: Former Smoker    Smokeless tobacco: Never Used    Tobacco comment: quit 2018   Substance and Sexual Activity    Alcohol use: Not Currently    Drug use: Not Currently     Comment: five months    Sexual activity: Yes       Objective:     Vitals:    03/09/20 1409   BP: 118/87   Pulse: 76       Physical Exam  Gen: well appearing, no acute distress  HEENT: normocephalic, EOMI  Neck: no tracheal deviation, no cervical LAD  CV: RRR  Respit: breathing non-labored  Abd: soft, non-tender, non-distended. Incisions healing well. No erythema or tenderness  Extr: warm and well perfused  MSK: moves all extremities appropriately  Neuro: alert, CN II - XII grossly intact  Psych: normal mood and affect    Significant Diagnostics:  Reviewed    Assessment/Plan:   You Seals is a 44 y.o. male s/p laparoscopic type III hiatal hernia repair with mesh, nissen fundoplication and EGD, and laparoscopic repair of an epigastric hernia on 2/4/2020. He has had an uneventful recovery and may follow up PRJACQUES Quiroz MD  General Surgery, PGY-1  [phone]     I have personally taken the history and examined this patient and agree with the resident's note as stated above.         Morris Miller MD

## 2020-04-28 ENCOUNTER — CLINICAL SUPPORT (OUTPATIENT)
Dept: INFECTIOUS DISEASES | Facility: CLINIC | Age: 45
End: 2020-04-28
Payer: MEDICAID

## 2020-04-28 PROCEDURE — 90471 IMMUNIZATION ADMIN: CPT | Mod: PBBFAC

## 2020-05-29 ENCOUNTER — HOSPITAL ENCOUNTER (OUTPATIENT)
Dept: RADIOLOGY | Facility: HOSPITAL | Age: 45
Discharge: HOME | End: 2020-05-29
Attending: INTERNAL MEDICINE
Payer: MEDICAID

## 2020-05-29 DIAGNOSIS — M25.512 LEFT SHOULDER PAIN: Primary | ICD-10-CM

## 2020-05-29 DIAGNOSIS — M25.512 LEFT SHOULDER PAIN: ICD-10-CM

## 2020-05-29 PROCEDURE — 73030 XR SHOULDER COMPLETE 2 OR MORE VIEWS LEFT: ICD-10-PCS | Mod: 26,LT,, | Performed by: RADIOLOGY

## 2020-05-29 PROCEDURE — 73030 X-RAY EXAM OF SHOULDER: CPT | Mod: 26,LT,, | Performed by: RADIOLOGY

## 2020-05-29 PROCEDURE — 73030 X-RAY EXAM OF SHOULDER: CPT | Mod: TC,FY,LT

## 2020-09-10 ENCOUNTER — LAB VISIT (OUTPATIENT)
Dept: LAB | Facility: HOSPITAL | Age: 45
End: 2020-09-10
Attending: INTERNAL MEDICINE
Payer: MEDICAID

## 2020-09-10 DIAGNOSIS — I35.1 AORTIC INSUFFICIENCY: Primary | ICD-10-CM

## 2020-09-10 DIAGNOSIS — D64.9 ANEMIA: Primary | ICD-10-CM

## 2020-09-10 LAB
ALBUMIN SERPL BCP-MCNC: 4.1 G/DL (ref 3.5–5.2)
ALP SERPL-CCNC: 112 U/L (ref 55–135)
ALT SERPL W/O P-5'-P-CCNC: 81 U/L (ref 10–44)
ANION GAP SERPL CALC-SCNC: 7 MMOL/L (ref 8–16)
AST SERPL-CCNC: 43 U/L (ref 10–40)
BASOPHILS # BLD AUTO: 0.07 K/UL (ref 0–0.2)
BASOPHILS NFR BLD: 0.9 % (ref 0–1.9)
BILIRUB SERPL-MCNC: 0.3 MG/DL (ref 0.1–1)
BUN SERPL-MCNC: 18 MG/DL (ref 6–20)
CALCIUM SERPL-MCNC: 10 MG/DL (ref 8.7–10.5)
CHLORIDE SERPL-SCNC: 103 MMOL/L (ref 95–110)
CHOLEST SERPL-MCNC: 241 MG/DL (ref 120–199)
CHOLEST/HDLC SERPL: 4.6 {RATIO} (ref 2–5)
CK SERPL-CCNC: 169 U/L (ref 20–200)
CO2 SERPL-SCNC: 27 MMOL/L (ref 23–29)
CREAT SERPL-MCNC: 1.1 MG/DL (ref 0.5–1.4)
DIFFERENTIAL METHOD: ABNORMAL
EOSINOPHIL # BLD AUTO: 0.3 K/UL (ref 0–0.5)
EOSINOPHIL NFR BLD: 4.3 % (ref 0–8)
ERYTHROCYTE [DISTWIDTH] IN BLOOD BY AUTOMATED COUNT: 14.2 % (ref 11.5–14.5)
EST. GFR  (AFRICAN AMERICAN): >60 ML/MIN/1.73 M^2
EST. GFR  (NON AFRICAN AMERICAN): >60 ML/MIN/1.73 M^2
ESTIMATED AVG GLUCOSE: 123 MG/DL (ref 68–131)
GLUCOSE SERPL-MCNC: 107 MG/DL (ref 70–110)
HBA1C MFR BLD HPLC: 5.9 % (ref 4–5.6)
HCT VFR BLD AUTO: 45.7 % (ref 40–54)
HDLC SERPL-MCNC: 52 MG/DL (ref 40–75)
HDLC SERPL: 21.6 % (ref 20–50)
HGB BLD-MCNC: 15.4 G/DL (ref 14–18)
IMM GRANULOCYTES # BLD AUTO: 0.13 K/UL (ref 0–0.04)
IMM GRANULOCYTES NFR BLD AUTO: 1.7 % (ref 0–0.5)
LDLC SERPL CALC-MCNC: 119.6 MG/DL (ref 63–159)
LYMPHOCYTES # BLD AUTO: 2.2 K/UL (ref 1–4.8)
LYMPHOCYTES NFR BLD: 29.1 % (ref 18–48)
MCH RBC QN AUTO: 31.1 PG (ref 27–31)
MCHC RBC AUTO-ENTMCNC: 33.7 G/DL (ref 32–36)
MCV RBC AUTO: 92 FL (ref 82–98)
MONOCYTES # BLD AUTO: 0.6 K/UL (ref 0.3–1)
MONOCYTES NFR BLD: 8.2 % (ref 4–15)
NEUTROPHILS # BLD AUTO: 4.3 K/UL (ref 1.8–7.7)
NEUTROPHILS NFR BLD: 55.8 % (ref 38–73)
NONHDLC SERPL-MCNC: 189 MG/DL
NRBC BLD-RTO: 0 /100 WBC
PLATELET # BLD AUTO: 380 K/UL (ref 150–350)
PMV BLD AUTO: 9.5 FL (ref 9.2–12.9)
POTASSIUM SERPL-SCNC: 4.9 MMOL/L (ref 3.5–5.1)
PROT SERPL-MCNC: 7.7 G/DL (ref 6–8.4)
RBC # BLD AUTO: 4.95 M/UL (ref 4.6–6.2)
SODIUM SERPL-SCNC: 137 MMOL/L (ref 136–145)
TRIGL SERPL-MCNC: 347 MG/DL (ref 30–150)
WBC # BLD AUTO: 7.71 K/UL (ref 3.9–12.7)

## 2020-09-10 PROCEDURE — 85025 COMPLETE CBC W/AUTO DIFF WBC: CPT

## 2020-09-10 PROCEDURE — 80061 LIPID PANEL: CPT

## 2020-09-10 PROCEDURE — 82550 ASSAY OF CK (CPK): CPT

## 2020-09-10 PROCEDURE — 80053 COMPREHEN METABOLIC PANEL: CPT

## 2020-09-10 PROCEDURE — 83036 HEMOGLOBIN GLYCOSYLATED A1C: CPT

## 2020-09-10 PROCEDURE — 36415 COLL VENOUS BLD VENIPUNCTURE: CPT

## 2020-09-18 ENCOUNTER — HOSPITAL ENCOUNTER (OUTPATIENT)
Dept: CARDIOLOGY | Facility: HOSPITAL | Age: 45
Discharge: HOME | End: 2020-09-18
Attending: INTERNAL MEDICINE
Payer: MEDICAID

## 2020-09-18 VITALS — HEIGHT: 71 IN | WEIGHT: 253 LBS | BODY MASS INDEX: 35.42 KG/M2

## 2020-09-18 DIAGNOSIS — I35.1 AORTIC INSUFFICIENCY: ICD-10-CM

## 2020-09-18 LAB
AORTIC ROOT ANNULUS: 3.34 CM
ASCENDING AORTA: 3.25 CM
AV INDEX (PROSTH): 0.67
AV MEAN GRADIENT: 5 MMHG
AV PEAK GRADIENT: 7 MMHG
AV VALVE AREA: 2.55 CM2
AV VELOCITY RATIO: 0.79
BSA FOR ECHO PROCEDURE: 2.4 M2
CV ECHO LV RWT: 0.35 CM
DOP CALC AO PEAK VEL: 1.36 M/S
DOP CALC AO VTI: 29.12 CM
DOP CALC LVOT AREA: 3.8 CM2
DOP CALC LVOT DIAMETER: 2.21 CM
DOP CALC LVOT PEAK VEL: 1.07 M/S
DOP CALC LVOT STROKE VOLUME: 74.38 CM3
DOP CALCLVOT PEAK VEL VTI: 19.4 CM
E WAVE DECELERATION TIME: 185.1 MSEC
E/A RATIO: 1.08
E/E' RATIO: 9.07 M/S
ECHO LV POSTERIOR WALL: 0.88 CM (ref 0.6–1.1)
FRACTIONAL SHORTENING: 38 % (ref 28–44)
INTERVENTRICULAR SEPTUM: 0.79 CM (ref 0.6–1.1)
IVRT: 108.47 MSEC
LA MAJOR: 5.4 CM
LA MINOR: 5.83 CM
LA WIDTH: 4.14 CM
LEFT ATRIUM SIZE: 4.02 CM
LEFT ATRIUM VOLUME INDEX: 34 ML/M2
LEFT ATRIUM VOLUME: 79.32 CM3
LEFT INTERNAL DIMENSION IN SYSTOLE: 3.13 CM (ref 2.1–4)
LEFT VENTRICLE DIASTOLIC VOLUME INDEX: 52.54 ML/M2
LEFT VENTRICLE DIASTOLIC VOLUME: 122.45 ML
LEFT VENTRICLE MASS INDEX: 63 G/M2
LEFT VENTRICLE SYSTOLIC VOLUME INDEX: 16.6 ML/M2
LEFT VENTRICLE SYSTOLIC VOLUME: 38.67 ML
LEFT VENTRICULAR INTERNAL DIMENSION IN DIASTOLE: 5.08 CM (ref 3.5–6)
LEFT VENTRICULAR MASS: 147.41 G
LV LATERAL E/E' RATIO: 8.5 M/S
LV SEPTAL E/E' RATIO: 9.71 M/S
MV PEAK A VEL: 0.63 M/S
MV PEAK E VEL: 0.68 M/S
MV STENOSIS PRESSURE HALF TIME: 53.68 MS
MV VALVE AREA P 1/2 METHOD: 4.1 CM2
PISA TR MAX VEL: 2.56 M/S
PULM VEIN S/D RATIO: 1.25
PV PEAK D VEL: 0.16 M/S
PV PEAK S VEL: 0.2 M/S
RA MAJOR: 4.61 CM
RA PRESSURE: 3 MMHG
RA WIDTH: 4.26 CM
RIGHT VENTRICULAR END-DIASTOLIC DIMENSION: 3 CM
STJ: 3.23 CM
TDI LATERAL: 0.08 M/S
TDI SEPTAL: 0.07 M/S
TDI: 0.08 M/S
TR MAX PG: 26 MMHG
TRICUSPID ANNULAR PLANE SYSTOLIC EXCURSION: 2.6 CM
TV REST PULMONARY ARTERY PRESSURE: 29 MMHG

## 2020-09-18 PROCEDURE — 93306 TTE W/DOPPLER COMPLETE: CPT

## (undated) DEVICE — SHEARS HARMONIC 5CM 36CM

## (undated) DEVICE — COVER LIGHT HANDLE 80/CA

## (undated) DEVICE — DRESSING LEUKOPLAST FLEX 1X3IN

## (undated) DEVICE — SYR ONLY LUER LOCK 20CC

## (undated) DEVICE — SEE MEDLINE ITEM 152622

## (undated) DEVICE — SUT CTD VICRYL VIL BR CR/SH

## (undated) DEVICE — TROCAR ENDOPATH XCEL 5MM 7.5CM

## (undated) DEVICE — DRAPE ABDOMINAL TIBURON 14X11

## (undated) DEVICE — SUT 3/0 27IN PDS II VIO MO

## (undated) DEVICE — Device

## (undated) DEVICE — SUT 0 VICRYL / UR6 (J603)

## (undated) DEVICE — SUT MCRYL PLUS 4-0 PS2 27IN

## (undated) DEVICE — NDL HYPO REG 25G X 1 1/2

## (undated) DEVICE — SEE MEDLINE ITEM 146417

## (undated) DEVICE — TROCAR ENDOPATH XCEL 12X100MM

## (undated) DEVICE — KIT ANTIFOG

## (undated) DEVICE — SUT SURGIDAC ENDO STITCH2-0

## (undated) DEVICE — SEE MEDLINE ITEM 152487

## (undated) DEVICE — TUBING HF INSUFFLATION W/ FLTR

## (undated) DEVICE — SUT COATED VICRYL 4/0 27IN

## (undated) DEVICE — WARMER DRAPE STERILE LF

## (undated) DEVICE — NDL 22GA X1 1/2 REG BEVEL

## (undated) DEVICE — TROCAR ENDOPATH XCEL 5X75MM

## (undated) DEVICE — LEGGINGS 48X31 INCH

## (undated) DEVICE — LOOP VESSEL BLUE MAXI

## (undated) DEVICE — TRAY MINOR GEN SURG

## (undated) DEVICE — MARKER SKIN STND TIP BLUE BARR

## (undated) DEVICE — SEE MEDLINE ITEM 154981

## (undated) DEVICE — TROCAR ENDOPATH XCEL 5X100MM

## (undated) DEVICE — SUT CTD VICRYL 3-0 VIL BR

## (undated) DEVICE — IRRIGATOR ENDOSCOPY DISP.

## (undated) DEVICE — CANNULA ENDOPATH XCEL 5X100MM

## (undated) DEVICE — SEE MEDLINE ITEM 146347

## (undated) DEVICE — SEALER LIGASURE LAP 37CM 5MM

## (undated) DEVICE — CLOSURE SKIN STERI STRIP 1/2X4

## (undated) DEVICE — KIT GELPORT LAPAROSCOPIC ABD

## (undated) DEVICE — LUBRICANT SURGILUBE 2 OZ

## (undated) DEVICE — TRAY FOLEY 16FR INFECTION CONT

## (undated) DEVICE — POUCH SENSURA MIO 3/8X2 1/8IN

## (undated) DEVICE — SEE MEDLINE ITEM 157117

## (undated) DEVICE — ENDOSTITCH INSTRUMENT

## (undated) DEVICE — TROCAR ENDOPATH XCEL 12MM 10CM

## (undated) DEVICE — SEE MEDLINE ITEM 156902

## (undated) DEVICE — SEE MEDLINE ITEM 157144

## (undated) DEVICE — SOL NS 1000CC

## (undated) DEVICE — ELECTRODE REM PLYHSV RETURN 9

## (undated) DEVICE — STAPLER CONTOUR 40MM GREEN

## (undated) DEVICE — STAPLER ECHELON PWR CIR 29MM

## (undated) DEVICE — SUT ENDOLOOP PDSII 18 LIGA

## (undated) DEVICE — NDL BOX COUNTER